# Patient Record
Sex: FEMALE | Race: OTHER | Employment: STUDENT | URBAN - METROPOLITAN AREA
[De-identification: names, ages, dates, MRNs, and addresses within clinical notes are randomized per-mention and may not be internally consistent; named-entity substitution may affect disease eponyms.]

---
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
## 2021-02-25 ENCOUNTER — OFFICE VISIT (OUTPATIENT)
Dept: FAMILY MEDICINE CLINIC | Facility: CLINIC | Age: 21
End: 2021-02-25
Payer: COMMERCIAL

## 2021-02-25 ENCOUNTER — LAB ENCOUNTER (OUTPATIENT)
Dept: LAB | Facility: HOSPITAL | Age: 21
End: 2021-02-25
Attending: FAMILY MEDICINE
Payer: COMMERCIAL

## 2021-02-25 ENCOUNTER — TELEPHONE (OUTPATIENT)
Dept: FAMILY MEDICINE CLINIC | Facility: CLINIC | Age: 21
End: 2021-02-25

## 2021-02-25 VITALS
RESPIRATION RATE: 16 BRPM | SYSTOLIC BLOOD PRESSURE: 133 MMHG | HEIGHT: 63 IN | BODY MASS INDEX: 26.08 KG/M2 | HEART RATE: 106 BPM | DIASTOLIC BLOOD PRESSURE: 78 MMHG | WEIGHT: 147.19 LBS

## 2021-02-25 DIAGNOSIS — N64.3 GALACTORRHEA: ICD-10-CM

## 2021-02-25 DIAGNOSIS — N64.3 GALACTORRHEA: Primary | ICD-10-CM

## 2021-02-25 DIAGNOSIS — Z00.00 ROUTINE PHYSICAL EXAMINATION: ICD-10-CM

## 2021-02-25 LAB
ESTRADIOL SERPL-MCNC: 18.9 PG/ML
HCG SERPL QL: NEGATIVE
PROGEST SERPL-MCNC: 0.94 NG/ML
PROLACTIN SERPL-MCNC: 37.3 NG/ML
TSI SER-ACNC: 3.05 MIU/ML (ref 0.36–3.74)

## 2021-02-25 PROCEDURE — 84146 ASSAY OF PROLACTIN: CPT

## 2021-02-25 PROCEDURE — 3075F SYST BP GE 130 - 139MM HG: CPT | Performed by: FAMILY MEDICINE

## 2021-02-25 PROCEDURE — 84703 CHORIONIC GONADOTROPIN ASSAY: CPT

## 2021-02-25 PROCEDURE — 84144 ASSAY OF PROGESTERONE: CPT

## 2021-02-25 PROCEDURE — 84443 ASSAY THYROID STIM HORMONE: CPT

## 2021-02-25 PROCEDURE — 3008F BODY MASS INDEX DOCD: CPT | Performed by: FAMILY MEDICINE

## 2021-02-25 PROCEDURE — 82306 VITAMIN D 25 HYDROXY: CPT

## 2021-02-25 PROCEDURE — 82670 ASSAY OF TOTAL ESTRADIOL: CPT

## 2021-02-25 PROCEDURE — 3078F DIAST BP <80 MM HG: CPT | Performed by: FAMILY MEDICINE

## 2021-02-25 PROCEDURE — 99213 OFFICE O/P EST LOW 20 MIN: CPT | Performed by: FAMILY MEDICINE

## 2021-02-25 PROCEDURE — 36415 COLL VENOUS BLD VENIPUNCTURE: CPT

## 2021-02-25 RX ORDER — AZELAIC ACID 0.15 G/G
GEL TOPICAL
COMMUNITY
Start: 2020-12-03 | End: 2021-09-14 | Stop reason: ALTCHOICE

## 2021-02-25 NOTE — PROGRESS NOTES
Blood pressure 133/78, pulse 106, resp. rate 16, height 5' 3\" (1.6 m), weight 147 lb 3 oz (66.8 kg), last menstrual period 02/08/2021, not currently breastfeeding.     Patient presents today complaining of bilateral nipple discharge with some itching on th

## 2021-02-26 LAB — 25(OH)D3 SERPL-MCNC: 15.5 NG/ML (ref 30–100)

## 2021-04-01 ENCOUNTER — HOSPITAL ENCOUNTER (OUTPATIENT)
Dept: MRI IMAGING | Age: 21
Discharge: HOME | End: 2021-04-01
Attending: FAMILY MEDICINE
Payer: COMMERCIAL

## 2021-04-01 DIAGNOSIS — N64.3 GALACTORRHEA: ICD-10-CM

## 2021-04-01 PROCEDURE — 70553 MRI BRAIN STEM W/O & W/DYE: CPT | Performed by: FAMILY MEDICINE

## 2021-04-01 PROCEDURE — A9575 INJ GADOTERATE MEGLUMI 0.1ML: HCPCS | Performed by: FAMILY MEDICINE

## 2021-06-06 ENCOUNTER — PATIENT MESSAGE (OUTPATIENT)
Dept: FAMILY MEDICINE CLINIC | Facility: CLINIC | Age: 21
End: 2021-06-06

## 2021-06-07 NOTE — TELEPHONE ENCOUNTER
From: Adeline Kimball  To: Kin Grace DO  Sent: 6/6/2021 7:21 PM CDT  Subject: Non-Urgent Medical Question    Hello,    I am starting a new job with [employer] and they are requesting proof of TDAP, MMR, Varicella, Flu Shot, and TB im

## 2021-06-10 NOTE — TELEPHONE ENCOUNTER
Patient is calling regarding status of immunization list. She needs by tomorrow 6/11. Please call back or send info via [email or web address].

## 2021-07-11 NOTE — TELEPHONE ENCOUNTER
, Please advise lov 3/21/17 for routine physical. Can we generate sports physical or would pt have to come in for visit.
, Please generate sport physical. Thank you
Call transferred
LMTCB- Message forward to . Please transferred to ext [phone]. Here until 7:30 PM today.
LMTCB- Please transferred to ext [phone].  Here until 5:30 PM.
LMTCB. Physical form is available for pick-up at [facility] front office.
NEED TO KNOW WHICH SPORTS AND WHICH SEASONS.
Ok to generate form for cross country.
Patient mother called and stated checking the status of form completion-    Requesting a call back when ready for -
Patient will be participating with [place] which is a Fall sport.
Patient's mother calling - requesting call when the physical form is ready and she will pick it up from the office.
Pt had an px on 3/21/17. Mother asking if Dr. Svetlana Lopez can complete an sports px based on 3/21 visit ?
Spoke with mom. Physical needed ASAP, or pt will be kicked out of program. Alejandrina Guadarrama wants a call back when she can  the sports physical, if possible today. Thank you!  NK
11-Jul-2021 08:56

## 2021-07-23 ENCOUNTER — TELEPHONE (OUTPATIENT)
Dept: OBGYN CLINIC | Facility: CLINIC | Age: 21
End: 2021-07-23

## 2021-07-23 NOTE — TELEPHONE ENCOUNTER
Pharmacy refill request for:    Aurovela FE 1/20 Tablets  Take 1 tablet by mouth daily(Need annual appt before any refills can be given)  QTY:84

## 2021-07-30 ENCOUNTER — OFFICE VISIT (OUTPATIENT)
Dept: OBGYN CLINIC | Facility: CLINIC | Age: 21
End: 2021-07-30
Payer: COMMERCIAL

## 2021-07-30 VITALS — BODY MASS INDEX: 27 KG/M2 | SYSTOLIC BLOOD PRESSURE: 110 MMHG | WEIGHT: 151 LBS | DIASTOLIC BLOOD PRESSURE: 78 MMHG

## 2021-07-30 DIAGNOSIS — Z11.3 SCREEN FOR STD (SEXUALLY TRANSMITTED DISEASE): Primary | ICD-10-CM

## 2021-07-30 DIAGNOSIS — Z30.41 ENCOUNTER FOR SURVEILLANCE OF CONTRACEPTIVE PILLS: ICD-10-CM

## 2021-07-30 PROCEDURE — 99213 OFFICE O/P EST LOW 20 MIN: CPT | Performed by: OBSTETRICS & GYNECOLOGY

## 2021-07-30 PROCEDURE — 3074F SYST BP LT 130 MM HG: CPT | Performed by: OBSTETRICS & GYNECOLOGY

## 2021-07-30 PROCEDURE — 3078F DIAST BP <80 MM HG: CPT | Performed by: OBSTETRICS & GYNECOLOGY

## 2021-07-30 RX ORDER — NORETHINDRONE ACETATE AND ETHINYL ESTRADIOL 1MG-20(21)
1 KIT ORAL DAILY
Qty: 84 TABLET | Refills: 3 | Status: SHIPPED | OUTPATIENT
Start: 2021-07-30 | End: 2021-09-14 | Stop reason: ALTCHOICE

## 2021-07-30 NOTE — PROGRESS NOTES
HPI/Subjective:   Patient ID: Aldo De Guzman is a 22year old female. Patient here for contraception surveillance. Has been on Microgestin for cycle control for the last six years. Desires to stop OCPs to see how cycle is now.   Patient was sexually a Appearance: Normal appearance. She is normal weight. Abdominal:      General: Abdomen is flat. Palpations: Abdomen is soft. Skin:     General: Skin is warm and dry. Neurological:      General: No focal deficit present.       Mental Status: She is

## 2021-08-02 ENCOUNTER — LAB ENCOUNTER (OUTPATIENT)
Dept: LAB | Facility: HOSPITAL | Age: 21
End: 2021-08-02
Attending: OBSTETRICS & GYNECOLOGY
Payer: COMMERCIAL

## 2021-08-02 DIAGNOSIS — Z11.3 SCREEN FOR STD (SEXUALLY TRANSMITTED DISEASE): ICD-10-CM

## 2021-08-02 PROCEDURE — 87491 CHLMYD TRACH DNA AMP PROBE: CPT

## 2021-08-02 PROCEDURE — 87591 N.GONORRHOEAE DNA AMP PROB: CPT

## 2021-08-03 LAB
C TRACH DNA SPEC QL NAA+PROBE: NEGATIVE
N GONORRHOEA DNA SPEC QL NAA+PROBE: NEGATIVE

## 2021-08-30 ENCOUNTER — HOSPITAL ENCOUNTER (OUTPATIENT)
Age: 21
Discharge: HOME | End: 2021-08-30
Payer: COMMERCIAL

## 2021-08-30 VITALS
SYSTOLIC BLOOD PRESSURE: 137 MMHG | WEIGHT: 150 LBS | BODY MASS INDEX: 26.58 KG/M2 | TEMPERATURE: 99 F | HEART RATE: 80 BPM | RESPIRATION RATE: 18 BRPM | DIASTOLIC BLOOD PRESSURE: 64 MMHG | HEIGHT: 63 IN | OXYGEN SATURATION: 100 %

## 2021-08-30 DIAGNOSIS — J02.0 STREPTOCOCCAL SORE THROAT: Primary | ICD-10-CM

## 2021-08-30 LAB — S PYO AG THROAT QL: POSITIVE

## 2021-08-30 PROCEDURE — 99213 OFFICE O/P EST LOW 20 MIN: CPT | Performed by: NURSE PRACTITIONER

## 2021-08-30 PROCEDURE — 87880 STREP A ASSAY W/OPTIC: CPT | Performed by: NURSE PRACTITIONER

## 2021-08-30 RX ORDER — AMOXICILLIN 500 MG/1
500 TABLET, FILM COATED ORAL 2 TIMES DAILY
Qty: 20 TABLET | Refills: 0 | Status: SHIPPED | OUTPATIENT
Start: 2021-08-30 | End: 2021-09-09

## 2021-09-02 NOTE — ED PROVIDER NOTES
Patient Seen in: [facility]      History   Patient presents with:  Sore Throat    Stated Complaint: SORE THROAT    HPI/Subjective:   HPI    21 yo female not concerned with covid arrives to the ic with st    Objective:   History reviewed.  No p oropharyngeal exudate. Eyes:      Conjunctiva/sclera: Conjunctivae normal.      Pupils: Pupils are equal, round, and reactive to light. Pulmonary:      Effort: Pulmonary effort is normal. No respiratory distress.    Abdominal:      General: Abdomen is f bedside monitoring of interventions, collecting and independently interpreting tests, discussion with consultants, patient communication/counseling, and chart documentation but not including time spent performing procedures.             MDM   Findings consi

## 2021-09-12 ENCOUNTER — HOSPITAL ENCOUNTER (OUTPATIENT)
Age: 21
Discharge: HOME | End: 2021-09-12
Payer: COMMERCIAL

## 2021-09-12 VITALS
RESPIRATION RATE: 18 BRPM | OXYGEN SATURATION: 99 % | BODY MASS INDEX: 26.58 KG/M2 | WEIGHT: 150 LBS | DIASTOLIC BLOOD PRESSURE: 75 MMHG | TEMPERATURE: 98 F | HEART RATE: 102 BPM | SYSTOLIC BLOOD PRESSURE: 135 MMHG | HEIGHT: 63 IN

## 2021-09-12 DIAGNOSIS — J02.0 STREPTOCOCCAL SORE THROAT: Primary | ICD-10-CM

## 2021-09-12 LAB
POCT MONO: NEGATIVE
S PYO AG THROAT QL: POSITIVE

## 2021-09-12 PROCEDURE — 86308 HETEROPHILE ANTIBODY SCREEN: CPT | Performed by: NURSE PRACTITIONER

## 2021-09-12 PROCEDURE — 36415 COLL VENOUS BLD VENIPUNCTURE: CPT | Performed by: NURSE PRACTITIONER

## 2021-09-12 PROCEDURE — 87880 STREP A ASSAY W/OPTIC: CPT | Performed by: NURSE PRACTITIONER

## 2021-09-12 PROCEDURE — 99213 OFFICE O/P EST LOW 20 MIN: CPT | Performed by: NURSE PRACTITIONER

## 2021-09-12 RX ORDER — DEXAMETHASONE SODIUM PHOSPHATE 10 MG/ML
10 INJECTION, SOLUTION INTRAMUSCULAR; INTRAVENOUS ONCE
Status: COMPLETED | OUTPATIENT
Start: 2021-09-12 | End: 2021-09-12

## 2021-09-12 RX ORDER — AMOXICILLIN AND CLAVULANATE POTASSIUM 875; 125 MG/1; MG/1
1 TABLET, FILM COATED ORAL 2 TIMES DAILY
Qty: 20 TABLET | Refills: 0 | Status: SHIPPED | OUTPATIENT
Start: 2021-09-12 | End: 2021-09-22

## 2021-09-12 RX ORDER — IBUPROFEN 600 MG/1
600 TABLET ORAL EVERY 8 HOURS PRN
Qty: 30 TABLET | Refills: 0 | Status: SHIPPED | OUTPATIENT
Start: 2021-09-12 | End: 2021-09-19

## 2021-09-12 NOTE — ED PROVIDER NOTES
Patient Seen in: [facility]      History   Patient presents with:  Sore Throat    Stated Complaint: sore throat/swelling    Subjective:   Evangeline Cockayne is a 59-year-old female presenting to the immediate care complaining of sore throat sinc above.    Physical Exam     ED Triage Vitals [09/12/21 1428]   /75   Pulse 102   Resp 18   Temp 98.4 °F (36.9 °C)   Temp src Temporal   SpO2 99 %   O2 Device None (Room air)       Current:/75   Pulse 102   Temp 98.4 °F (36.9 °C) (Temporal)   Re Capillary refill takes less than 2 seconds. Findings: No rash. Neurological:      General: No focal deficit present. Mental Status: She is alert and oriented to person, place, and time.    Psychiatric:         Mood and Affect: Mood normal. and Plan     Clinical Impression:  Streptococcal sore throat  (primary encounter diagnosis)     Disposition:  Discharge  9/12/2021  2:57 pm    Follow-up:  Giselle Min DO  [address] [place] [id number]                Medic

## 2021-09-14 ENCOUNTER — OFFICE VISIT (OUTPATIENT)
Dept: FAMILY MEDICINE CLINIC | Facility: CLINIC | Age: 21
End: 2021-09-14
Payer: COMMERCIAL

## 2021-09-14 VITALS
HEIGHT: 63 IN | WEIGHT: 150 LBS | DIASTOLIC BLOOD PRESSURE: 72 MMHG | BODY MASS INDEX: 26.58 KG/M2 | HEART RATE: 89 BPM | SYSTOLIC BLOOD PRESSURE: 118 MMHG

## 2021-09-14 DIAGNOSIS — J35.01 TONSILLITIS, CHRONIC: Primary | ICD-10-CM

## 2021-09-14 PROCEDURE — 3008F BODY MASS INDEX DOCD: CPT | Performed by: FAMILY MEDICINE

## 2021-09-14 PROCEDURE — 99213 OFFICE O/P EST LOW 20 MIN: CPT | Performed by: FAMILY MEDICINE

## 2021-09-14 PROCEDURE — 3074F SYST BP LT 130 MM HG: CPT | Performed by: FAMILY MEDICINE

## 2021-09-14 PROCEDURE — 3078F DIAST BP <80 MM HG: CPT | Performed by: FAMILY MEDICINE

## 2021-09-14 RX ORDER — DAPSONE 75 MG/G
GEL TOPICAL
COMMUNITY
Start: 2021-05-22

## 2021-09-14 NOTE — PROGRESS NOTES
Blood pressure 118/72, pulse 89, height 5' 3\" (1.6 m), weight 150 lb (68 kg), last menstrual period 08/22/2021, not currently breastfeeding. Recently diagnosed with strep. Taking Augmentin. Has dysphagia no dysphonia.   Has noticed improvement no

## 2021-09-16 ENCOUNTER — OFFICE VISIT (OUTPATIENT)
Dept: OTOLARYNGOLOGY | Facility: CLINIC | Age: 21
End: 2021-09-16
Payer: COMMERCIAL

## 2021-09-16 VITALS — TEMPERATURE: 98 F | BODY MASS INDEX: 26.58 KG/M2 | HEIGHT: 63 IN | WEIGHT: 150 LBS

## 2021-09-16 DIAGNOSIS — H60.543 DERMATITIS OF EAR CANAL, BILATERAL: ICD-10-CM

## 2021-09-16 DIAGNOSIS — J03.90 TONSILLITIS: Primary | ICD-10-CM

## 2021-09-16 PROCEDURE — 3008F BODY MASS INDEX DOCD: CPT | Performed by: OTOLARYNGOLOGY

## 2021-09-16 PROCEDURE — 99204 OFFICE O/P NEW MOD 45 MIN: CPT | Performed by: OTOLARYNGOLOGY

## 2021-09-16 RX ORDER — FLUOCINOLONE ACETONIDE 0.11 MG/ML
OIL AURICULAR (OTIC)
Qty: 1 EACH | Refills: 1 | Status: SHIPPED | OUTPATIENT
Start: 2021-09-16

## 2021-09-16 RX ORDER — PREDNISONE 20 MG/1
TABLET ORAL
Qty: 7 TABLET | Refills: 0 | Status: SHIPPED | OUTPATIENT
Start: 2021-09-16 | End: 2021-12-02 | Stop reason: ALTCHOICE

## 2021-09-16 NOTE — PROGRESS NOTES
Cm Figueroa is a 22year old female. Patient presents with:   Tonsil Problem: patient stated white spot at the back of throat tonsilt are still little swollen positive strep x2  on antibiotic  Ear Problem: pt stated both ears and itchy       HISTORY OF heat intolerance. Neuro Negative Tremors. Psych Negative Anxiety and depression. Integumentary Negative Frequent skin infections, pigment change and rash. Hema/Lymph Negative Easy bleeding and easy bruising.            PHYSICAL EXAM    Temp 97.8 °F 1 tablet (600 mg total) by mouth every 8 (eight) hours as needed for Pain or Fever., Disp: 30 tablet, Rfl: 0  •  Levocetirizine Dihydrochloride 5 MG Oral Tab, Take 1 tablet (5 mg total) by mouth every evening., Disp: 90 tablet, Rfl: 2  •  triamcinolone ace

## 2021-10-05 ENCOUNTER — TELEPHONE (OUTPATIENT)
Dept: OTOLARYNGOLOGY | Facility: CLINIC | Age: 21
End: 2021-10-05

## 2021-10-05 RX ORDER — CLINDAMYCIN HYDROCHLORIDE 300 MG/1
300 CAPSULE ORAL 3 TIMES DAILY
Qty: 42 CAPSULE | Refills: 0 | Status: SHIPPED | OUTPATIENT
Start: 2021-10-05 | End: 2021-10-19

## 2021-10-05 NOTE — TELEPHONE ENCOUNTER
Patient calling back to follow up. Would like to know if theres any antibiotics that she can take in the mean time.  Please advise

## 2021-10-05 NOTE — TELEPHONE ENCOUNTER
Dr Madhu Gunter made aware  of note below and order for Clindamycin 300 mg tid x 14 days ,Rx sent to pharmacy ,pt informed and verbalized understanding.

## 2021-10-05 NOTE — TELEPHONE ENCOUNTER
Dr Gill Sham per pt symptoms is back again sore throat she feels like having another strep,informed pt per last note if having frequent infection consider tonsillectomy,pt verbalized understanding,please advise.

## 2021-12-02 ENCOUNTER — OFFICE VISIT (OUTPATIENT)
Dept: FAMILY MEDICINE CLINIC | Facility: CLINIC | Age: 21
End: 2021-12-02
Payer: COMMERCIAL

## 2021-12-02 VITALS
WEIGHT: 157 LBS | BODY MASS INDEX: 27.82 KG/M2 | DIASTOLIC BLOOD PRESSURE: 75 MMHG | HEART RATE: 80 BPM | HEIGHT: 63 IN | SYSTOLIC BLOOD PRESSURE: 118 MMHG

## 2021-12-02 DIAGNOSIS — Z00.00 ROUTINE PHYSICAL EXAMINATION: Primary | ICD-10-CM

## 2021-12-02 DIAGNOSIS — E55.9 VITAMIN D DEFICIENCY: ICD-10-CM

## 2021-12-02 PROCEDURE — 3008F BODY MASS INDEX DOCD: CPT | Performed by: FAMILY MEDICINE

## 2021-12-02 PROCEDURE — 3074F SYST BP LT 130 MM HG: CPT | Performed by: FAMILY MEDICINE

## 2021-12-02 PROCEDURE — 99395 PREV VISIT EST AGE 18-39: CPT | Performed by: FAMILY MEDICINE

## 2021-12-02 PROCEDURE — 3078F DIAST BP <80 MM HG: CPT | Performed by: FAMILY MEDICINE

## 2021-12-02 NOTE — PROGRESS NOTES
REASON FOR VISIT:    Cm Figueroa is a 22year old female who presents for an [address].         Patient Active Problem List:     Acute pharyngitis     Encounter for routine child health examination without abnormal findings     Excessive specific diagnoses    ALLERGIES:   No Known Allergies  CURRENT MEDICATIONS:   Current Outpatient Medications   Medication Sig Dispense Refill   • Fluocinolone Acetonide 0.01 % Otic Oil 3 drops bid for 14 days 1 each 1   • Dapsone 7.5 % External Gel      • 118/72  09/12/21 : 135/75    Patient's last menstrual period was 08/22/2021 (approximate).     GENERAL: well developed, well nourished, in no apparent distress   SKIN: no rashes, no suspicious lesions  HEENT: atraumatic, normocephalic, ears and throat are c

## 2022-01-10 ENCOUNTER — TELEPHONE (OUTPATIENT)
Dept: INTERNAL MEDICINE CLINIC | Facility: HOSPITAL | Age: 22
End: 2022-01-10

## 2022-01-10 NOTE — TELEPHONE ENCOUNTER
Outside Covid Testing done    Results and RTW guidelines:    COVID RESULT reported:      Test type:    [x] Rapid outside         [] PCR outside       [] Home Test    Date of test: 12/30/2021    Test location: [address] [place] [address]      [] Result viewed in Epic with verbal c communication open with management about RTW and if symptoms worsen     Notes:     RTW PLAN:    []  If COVID positive results, off work minimum of 5 days from positive test or onset of symptoms (day 0)    []      On day 5, if asymptomatic or mildly symptom 100F               Yes []          • Cough                          Yes []      • Shortness of breath  Yes []      • Congestion                 Yes []      • Runny nose                Yes []        • Loss of Smell              Yes []       •  Loss of Taste

## 2022-01-10 NOTE — TELEPHONE ENCOUNTER
Attempted to call employee at number listed and the number is disconnected. Will route to in basket until we can speak to employee.

## 2022-01-18 NOTE — TELEPHONE ENCOUNTER
Department:                               [] [facility]  []NIKI   [x] [facility]    Dept Manager/Supervisor/team or clinical lead: Melissa Grubbs    Position:  [] MD     [] RN     [] Respiratory Therapist     [] PCT     [] PSR      [] BHA     [] MA [x] Other Cindy Rosas you next scheduled to work? Did you have close contact with someone on your unit while not wearing a mask? (e.g., during meal breaks):  Yes []   No []    If yes, who:   Do you share a workspace? Yes []   No []       If yes, with whom?    Do you have any if asymptomatic or mildly symptomatic (with improving symptoms) may return to work day 6  []      On day 5, if symptomatic, call Employee Health for RTW screening        []  Plan noted above  []  Length of time to obtain results  []  Quarantine instruction

## 2022-04-21 ENCOUNTER — OFFICE VISIT (OUTPATIENT)
Dept: FAMILY MEDICINE CLINIC | Facility: CLINIC | Age: 22
End: 2022-04-21
Payer: COMMERCIAL

## 2022-04-21 ENCOUNTER — NURSE TRIAGE (OUTPATIENT)
Dept: FAMILY MEDICINE CLINIC | Facility: CLINIC | Age: 22
End: 2022-04-21

## 2022-04-21 VITALS
HEIGHT: 63 IN | DIASTOLIC BLOOD PRESSURE: 70 MMHG | WEIGHT: 158 LBS | BODY MASS INDEX: 28 KG/M2 | SYSTOLIC BLOOD PRESSURE: 119 MMHG | HEART RATE: 61 BPM

## 2022-04-21 DIAGNOSIS — J02.0 STREP PHARYNGITIS: Primary | ICD-10-CM

## 2022-04-21 DIAGNOSIS — J02.9 SORE THROAT: ICD-10-CM

## 2022-04-21 LAB
CONTROL LINE PRESENT WITH A CLEAR BACKGROUND (YES/NO): YES YES/NO
KIT LOT #: NORMAL NUMERIC
STREP GRP A CUL-SCR: POSITIVE

## 2022-04-21 PROCEDURE — 3008F BODY MASS INDEX DOCD: CPT | Performed by: PHYSICIAN ASSISTANT

## 2022-04-21 PROCEDURE — 3078F DIAST BP <80 MM HG: CPT | Performed by: PHYSICIAN ASSISTANT

## 2022-04-21 PROCEDURE — 99213 OFFICE O/P EST LOW 20 MIN: CPT | Performed by: PHYSICIAN ASSISTANT

## 2022-04-21 PROCEDURE — 87880 STREP A ASSAY W/OPTIC: CPT | Performed by: PHYSICIAN ASSISTANT

## 2022-04-21 PROCEDURE — 3074F SYST BP LT 130 MM HG: CPT | Performed by: PHYSICIAN ASSISTANT

## 2022-04-21 RX ORDER — AMOXICILLIN 875 MG/1
875 TABLET, COATED ORAL 2 TIMES DAILY
Qty: 20 TABLET | Refills: 0 | Status: SHIPPED | OUTPATIENT
Start: 2022-04-21 | End: 2022-05-01

## 2022-08-19 ENCOUNTER — IMMUNIZATION (OUTPATIENT)
Dept: LAB | Age: 22
End: 2022-08-19
Attending: EMERGENCY MEDICINE
Payer: COMMERCIAL

## 2022-08-19 DIAGNOSIS — Z23 NEED FOR VACCINATION: Primary | ICD-10-CM

## 2022-08-19 PROCEDURE — 0064A SARSCOV2 VAC 50MCG/0.25ML IM: CPT

## 2022-10-17 ENCOUNTER — HOSPITAL ENCOUNTER (OUTPATIENT)
Age: 22
Discharge: HOME | End: 2022-10-17
Payer: COMMERCIAL

## 2022-10-17 VITALS
OXYGEN SATURATION: 100 % | BODY MASS INDEX: 27.46 KG/M2 | HEART RATE: 59 BPM | SYSTOLIC BLOOD PRESSURE: 125 MMHG | RESPIRATION RATE: 18 BRPM | TEMPERATURE: 99 F | DIASTOLIC BLOOD PRESSURE: 65 MMHG | WEIGHT: 155 LBS | HEIGHT: 63 IN

## 2022-10-17 DIAGNOSIS — Z20.822 ENCOUNTER FOR LABORATORY TESTING FOR COVID-19 VIRUS: Primary | ICD-10-CM

## 2022-10-17 DIAGNOSIS — J02.0 STREPTOCOCCAL SORE THROAT: ICD-10-CM

## 2022-10-17 LAB
S PYO AG THROAT QL: POSITIVE
SARS-COV-2 RNA RESP QL NAA+PROBE: NOT DETECTED

## 2022-10-17 PROCEDURE — 99213 OFFICE O/P EST LOW 20 MIN: CPT

## 2022-10-17 PROCEDURE — 87880 STREP A ASSAY W/OPTIC: CPT

## 2022-10-17 PROCEDURE — U0002 COVID-19 LAB TEST NON-CDC: HCPCS

## 2022-10-17 RX ORDER — AMOXICILLIN 500 MG/1
500 TABLET, FILM COATED ORAL 2 TIMES DAILY
Qty: 20 TABLET | Refills: 0 | Status: SHIPPED | OUTPATIENT
Start: 2022-10-17 | End: 2022-10-27

## 2022-10-17 NOTE — ED INITIAL ASSESSMENT (HPI)
Pt in [address] [place] [address] for c/o sore throat, concerned for strep. States has been having sore throat, pain to uvula, runny nose and R ear pain x today. No fever.

## 2023-02-25 ENCOUNTER — HOSPITAL ENCOUNTER (OUTPATIENT)
Age: 23
Discharge: HOME | End: 2023-02-25
Payer: COMMERCIAL

## 2023-02-25 VITALS
BODY MASS INDEX: 26.58 KG/M2 | DIASTOLIC BLOOD PRESSURE: 62 MMHG | RESPIRATION RATE: 18 BRPM | TEMPERATURE: 98 F | WEIGHT: 150 LBS | SYSTOLIC BLOOD PRESSURE: 129 MMHG | OXYGEN SATURATION: 97 % | HEIGHT: 63 IN | HEART RATE: 84 BPM

## 2023-02-25 DIAGNOSIS — J02.9 SORE THROAT: ICD-10-CM

## 2023-02-25 DIAGNOSIS — J06.9 UPPER RESPIRATORY TRACT INFECTION, UNSPECIFIED TYPE: Primary | ICD-10-CM

## 2023-02-25 LAB
S PYO AG THROAT QL: NEGATIVE
SARS-COV-2 RNA RESP QL NAA+PROBE: NOT DETECTED

## 2023-02-25 PROCEDURE — U0002 COVID-19 LAB TEST NON-CDC: HCPCS | Performed by: EMERGENCY MEDICINE

## 2023-02-25 PROCEDURE — 99213 OFFICE O/P EST LOW 20 MIN: CPT | Performed by: EMERGENCY MEDICINE

## 2023-02-25 PROCEDURE — 87880 STREP A ASSAY W/OPTIC: CPT | Performed by: EMERGENCY MEDICINE

## 2023-02-25 RX ORDER — IBUPROFEN 600 MG/1
600 TABLET ORAL ONCE
Status: COMPLETED | OUTPATIENT
Start: 2023-02-25 | End: 2023-02-25

## 2023-02-25 NOTE — DISCHARGE INSTRUCTIONS
Read after visit summary for home treatment and er precautions. Salt water gargles every few hours,   Avoid spicy foods. Work note provided. Throat lozenges. Cool mist humidifier at night. Mucinex fast max all in one cold and flu \"white bottle with color wheel on front of it. \"

## 2023-09-29 ENCOUNTER — LAB ENCOUNTER (OUTPATIENT)
Dept: LAB | Age: 23
End: 2023-09-29
Attending: FAMILY MEDICINE
Payer: COMMERCIAL

## 2023-09-29 ENCOUNTER — OFFICE VISIT (OUTPATIENT)
Dept: FAMILY MEDICINE CLINIC | Facility: CLINIC | Age: 23
End: 2023-09-29

## 2023-09-29 VITALS
DIASTOLIC BLOOD PRESSURE: 71 MMHG | SYSTOLIC BLOOD PRESSURE: 113 MMHG | WEIGHT: 172 LBS | HEIGHT: 63 IN | BODY MASS INDEX: 30.48 KG/M2 | HEART RATE: 76 BPM

## 2023-09-29 DIAGNOSIS — E55.9 VITAMIN D DEFICIENCY: ICD-10-CM

## 2023-09-29 DIAGNOSIS — N92.4 EXCESSIVE BLEEDING IN PREMENOPAUSAL PERIOD: ICD-10-CM

## 2023-09-29 DIAGNOSIS — E55.9 VITAMIN D DEFICIENCY: Primary | ICD-10-CM

## 2023-09-29 LAB
BASOPHILS # BLD AUTO: 0.04 X10(3) UL (ref 0–0.2)
BASOPHILS NFR BLD AUTO: 0.8 %
CHOLEST SERPL-MCNC: 128 MG/DL (ref ?–200)
DEPRECATED RDW RBC AUTO: 37.8 FL (ref 35.1–46.3)
EOSINOPHIL # BLD AUTO: 0.02 X10(3) UL (ref 0–0.7)
EOSINOPHIL NFR BLD AUTO: 0.4 %
ERYTHROCYTE [DISTWIDTH] IN BLOOD BY AUTOMATED COUNT: 12.5 % (ref 11–15)
FASTING PATIENT GLUCOSE ANSWER: YES
FASTING PATIENT LIPID ANSWER: YES
GLUCOSE BLD-MCNC: 86 MG/DL (ref 70–99)
HCT VFR BLD AUTO: 36.4 %
HDLC SERPL-MCNC: 43 MG/DL (ref 40–59)
HGB BLD-MCNC: 12.5 G/DL
IMM GRANULOCYTES # BLD AUTO: 0.01 X10(3) UL (ref 0–1)
IMM GRANULOCYTES NFR BLD: 0.2 %
LDLC SERPL CALC-MCNC: 77 MG/DL (ref ?–100)
LYMPHOCYTES # BLD AUTO: 1.2 X10(3) UL (ref 1–4)
LYMPHOCYTES NFR BLD AUTO: 23.3 %
MCH RBC QN AUTO: 28.3 PG (ref 26–34)
MCHC RBC AUTO-ENTMCNC: 34.3 G/DL (ref 31–37)
MCV RBC AUTO: 82.5 FL
MONOCYTES # BLD AUTO: 0.32 X10(3) UL (ref 0.1–1)
MONOCYTES NFR BLD AUTO: 6.2 %
NEUTROPHILS # BLD AUTO: 3.57 X10 (3) UL (ref 1.5–7.7)
NEUTROPHILS # BLD AUTO: 3.57 X10(3) UL (ref 1.5–7.7)
NEUTROPHILS NFR BLD AUTO: 69.1 %
NONHDLC SERPL-MCNC: 85 MG/DL (ref ?–130)
PLATELET # BLD AUTO: 227 10(3)UL (ref 150–450)
RBC # BLD AUTO: 4.41 X10(6)UL
TRIGL SERPL-MCNC: 31 MG/DL (ref 30–149)
TSI SER-ACNC: 1.57 MIU/ML (ref 0.36–3.74)
VIT D+METAB SERPL-MCNC: 24.2 NG/ML (ref 30–100)
VLDLC SERPL CALC-MCNC: 5 MG/DL (ref 0–30)
WBC # BLD AUTO: 5.2 X10(3) UL (ref 4–11)

## 2023-09-29 PROCEDURE — 85025 COMPLETE CBC W/AUTO DIFF WBC: CPT

## 2023-09-29 PROCEDURE — 90472 IMMUNIZATION ADMIN EACH ADD: CPT | Performed by: FAMILY MEDICINE

## 2023-09-29 PROCEDURE — 99213 OFFICE O/P EST LOW 20 MIN: CPT | Performed by: FAMILY MEDICINE

## 2023-09-29 PROCEDURE — 3078F DIAST BP <80 MM HG: CPT | Performed by: FAMILY MEDICINE

## 2023-09-29 PROCEDURE — 82306 VITAMIN D 25 HYDROXY: CPT

## 2023-09-29 PROCEDURE — 3008F BODY MASS INDEX DOCD: CPT | Performed by: FAMILY MEDICINE

## 2023-09-29 PROCEDURE — 3074F SYST BP LT 130 MM HG: CPT | Performed by: FAMILY MEDICINE

## 2023-09-29 PROCEDURE — 80061 LIPID PANEL: CPT

## 2023-09-29 PROCEDURE — 90686 IIV4 VACC NO PRSV 0.5 ML IM: CPT | Performed by: FAMILY MEDICINE

## 2023-09-29 PROCEDURE — 82947 ASSAY GLUCOSE BLOOD QUANT: CPT

## 2023-09-29 PROCEDURE — 36415 COLL VENOUS BLD VENIPUNCTURE: CPT

## 2023-09-29 PROCEDURE — 90471 IMMUNIZATION ADMIN: CPT | Performed by: FAMILY MEDICINE

## 2023-09-29 PROCEDURE — 84443 ASSAY THYROID STIM HORMONE: CPT

## 2023-09-29 PROCEDURE — 90715 TDAP VACCINE 7 YRS/> IM: CPT | Performed by: FAMILY MEDICINE

## 2023-09-29 RX ORDER — DOXYCYCLINE 100 MG/1
CAPSULE ORAL
COMMUNITY

## 2023-09-29 RX ORDER — SPIRONOLACTONE 25 MG/1
TABLET ORAL
COMMUNITY

## 2023-09-29 NOTE — PROGRESS NOTES
Blood pressure 113/71, pulse 76, height 5' 3\" (1.6 m), weight 172 lb (78 kg), last menstrual period 02/04/2023, not currently breastfeeding. Patient presents today following up for weight gain. She exercises. She reports that she does get heavy menses. History of vitamin D deficiency.     Objective patient is comfortable no apparent distress    Assessment history of vitamin D deficiency with menorrhagia weight gain    Plan blood test today we will follow with results discussed dietary options

## 2024-01-24 ENCOUNTER — HOSPITAL ENCOUNTER (OUTPATIENT)
Age: 24
Discharge: HOME | End: 2024-01-24
Payer: COMMERCIAL

## 2024-01-24 VITALS
TEMPERATURE: 98 F | OXYGEN SATURATION: 98 % | SYSTOLIC BLOOD PRESSURE: 127 MMHG | DIASTOLIC BLOOD PRESSURE: 69 MMHG | HEART RATE: 81 BPM | RESPIRATION RATE: 18 BRPM

## 2024-01-24 DIAGNOSIS — J02.9 VIRAL PHARYNGITIS: ICD-10-CM

## 2024-01-24 DIAGNOSIS — B34.9 VIRAL ILLNESS: Primary | ICD-10-CM

## 2024-01-24 LAB
S PYO AG THROAT QL: NEGATIVE
SARS-COV-2 RNA RESP QL NAA+PROBE: NOT DETECTED

## 2024-01-24 PROCEDURE — U0002 COVID-19 LAB TEST NON-CDC: HCPCS | Performed by: PHYSICIAN ASSISTANT

## 2024-01-24 PROCEDURE — 87880 STREP A ASSAY W/OPTIC: CPT | Performed by: PHYSICIAN ASSISTANT

## 2024-01-24 PROCEDURE — 99213 OFFICE O/P EST LOW 20 MIN: CPT | Performed by: PHYSICIAN ASSISTANT

## 2024-01-24 NOTE — DISCHARGE INSTRUCTIONS
Today you were diagnosed with a viral illness.  Take antihistamines and decongestants for sinus congestion and postnasal drip cough.  At nighttime take 1 to 2 tablets, 25 mg to 50 mg tablets of diphenhydramine (Benadryl) to help with sinus congestion and cough.  You may additionally take 1 tablet of Claritin or Zyrtec during the day to help with sinus congestion.  For added decongestion relief, you may additionally take pseudoephedrine (Sudafed).  Take Tylenol and ibuprofen as needed for pain.  Seek prompt medical reevaluation if you begin to have worsening pain, difficulty swallowing, drooling, shortness of breath or chest pain.

## 2024-01-24 NOTE — ED PROVIDER NOTES
Patient Seen in: [facility]      History     Chief Complaint   Patient presents with    Sore Throat     Stated Complaint: Sore Throat    Subjective:   HPI    Patient is a healthy 23-year-old female who presents to immediate care due to sore throat that began last night.  Associate symptoms include mild cough sinus congestion.  Denies known sick contacts.  Took Excedrin last night with significant relief.  Denying chest pain shortness of breath wheezing difficulty swallowing drooling.    Objective:   No pertinent past medical history.            No pertinent past surgical history.              No pertinent social history.            Review of Systems    Positive for stated complaint: Sore Throat  Other systems are as noted in HPI.  Constitutional and vital signs reviewed.      All other systems reviewed and negative except as noted above.    Physical Exam     ED Triage Vitals [01/24/24 1002]   /69   Pulse 81   Resp 18   Temp 98.2 °F (36.8 °C)   Temp src Temporal   SpO2 98 %   O2 Device None (Room air)       Current:/69   Pulse 81   Temp 98.2 °F (36.8 °C) (Temporal)   Resp 18   LMP 02/04/2023   SpO2 98%         Physical Exam    Vital signs reviewed. Nursing note reviewed.  Constitutional: Well-developed. Well-nourished. In no acute distress  HENT: Mucous membranes moist. TMs intact bilaterally. No trismus. Uvula midline. Mild posterior pharynx erythema.  No petechiae, exudates, or posterior pharynx edema.  EYES: No scleral icterus or conjunctival injection.  NECK: Full ROM. Supple.   CARDIAC: Normal rate. Normal S1/ S2. 2+ distal pulses. No edema  PULM/CHEST: Clear to auscultation bilaterally. No wheezes  Extremities: Full ROM  NEURO: Awake, alert, following commands, moving extremities, answering questions.   SKIN: Warm and dry. No rash or lesions.  PSYCH: Normal judgment. Normal affect.        ED Course     Labs Reviewed   POCT RAPID STREP - Normal   RAPID SARS-COV-2 BY PCR - Normal                       MDM      Patient is a healthy 23-year-old female who presents to immediate care due to sore throat x 1 day.  Patient arrives afebrile nontoxic speaking complete sentences.  Physical exam showing mild posterior pharynx erythema.  Most likely viral pharyngitis, viral URI, viral illness less likely COVID-19 and strep pharyngitis as patient had rapid negative test today in immediate care.  Discussed conservative treatment including Tylenol ibuprofen as needed for pain, over-the-counter cough medication such as Robitussin.  History given by patient.  Return precautions including worsening pain difficulty swallowing drooling shortness of breath.  Patient agreeable to plan all questions answered.                                   Medical Decision Making      Disposition and Plan     Clinical Impression:  1. Viral illness    2. Viral pharyngitis         Disposition:  Discharge  1/24/2024 10:50 am    Follow-up:  Skinny Grace, DO  [address] [place] [address]  [phone]    Call             Medications Prescribed:  Current Discharge Medication List

## 2024-02-26 ENCOUNTER — OFFICE VISIT (OUTPATIENT)
Dept: FAMILY MEDICINE CLINIC | Facility: CLINIC | Age: 24
End: 2024-02-26
Payer: COMMERCIAL

## 2024-02-26 VITALS
HEART RATE: 97 BPM | BODY MASS INDEX: 31.01 KG/M2 | WEIGHT: 175 LBS | HEIGHT: 63 IN | DIASTOLIC BLOOD PRESSURE: 76 MMHG | SYSTOLIC BLOOD PRESSURE: 113 MMHG

## 2024-02-26 DIAGNOSIS — L03.317 CELLULITIS OF BUTTOCK: Primary | ICD-10-CM

## 2024-02-26 PROCEDURE — 3074F SYST BP LT 130 MM HG: CPT | Performed by: FAMILY MEDICINE

## 2024-02-26 PROCEDURE — 99213 OFFICE O/P EST LOW 20 MIN: CPT | Performed by: FAMILY MEDICINE

## 2024-02-26 PROCEDURE — 3078F DIAST BP <80 MM HG: CPT | Performed by: FAMILY MEDICINE

## 2024-02-26 PROCEDURE — 3008F BODY MASS INDEX DOCD: CPT | Performed by: FAMILY MEDICINE

## 2024-02-26 RX ORDER — CEPHALEXIN 500 MG/1
500 TABLET ORAL 4 TIMES DAILY
Qty: 40 TABLET | Refills: 0 | Status: SHIPPED | OUTPATIENT
Start: 2024-02-26

## 2024-02-27 NOTE — PROGRESS NOTES
Blood pressure 113/76, pulse 97, height 5' 3\" (1.6 m), weight 175 lb (79.4 kg), not currently breastfeeding.      Tender red bump near gluteal cleft for about a week.  No fever.  Otherwise feels well.    Objective right buttock near gluteal cleft superior aspect with erythematous tender bump nonfluctuant no drainage    Assessment cellulitis    Plan cephalexin sitz baths follow-up if no improvement

## 2024-02-29 ENCOUNTER — TELEPHONE (OUTPATIENT)
Dept: FAMILY MEDICINE CLINIC | Facility: CLINIC | Age: 24
End: 2024-02-29

## 2024-02-29 NOTE — TELEPHONE ENCOUNTER
Patient contacts clinic reporting symptoms on buttock are unchanged.  Same level of discomfort.  Denies new redness, swelling or fever.  Does not feel abx are helping.  Dr. Grace please advise on next steps.

## 2024-03-01 ENCOUNTER — OFFICE VISIT (OUTPATIENT)
Dept: FAMILY MEDICINE CLINIC | Facility: CLINIC | Age: 24
End: 2024-03-01
Payer: COMMERCIAL

## 2024-03-01 VITALS
WEIGHT: 174 LBS | DIASTOLIC BLOOD PRESSURE: 71 MMHG | BODY MASS INDEX: 30.83 KG/M2 | HEIGHT: 63 IN | HEART RATE: 105 BPM | SYSTOLIC BLOOD PRESSURE: 104 MMHG

## 2024-03-01 DIAGNOSIS — L02.31 GLUTEAL ABSCESS: Primary | ICD-10-CM

## 2024-03-01 PROCEDURE — 3074F SYST BP LT 130 MM HG: CPT | Performed by: FAMILY MEDICINE

## 2024-03-01 PROCEDURE — 3078F DIAST BP <80 MM HG: CPT | Performed by: FAMILY MEDICINE

## 2024-03-01 PROCEDURE — 99213 OFFICE O/P EST LOW 20 MIN: CPT | Performed by: FAMILY MEDICINE

## 2024-03-01 PROCEDURE — 3008F BODY MASS INDEX DOCD: CPT | Performed by: FAMILY MEDICINE

## 2024-03-01 RX ORDER — HYDROCODONE BITARTRATE AND ACETAMINOPHEN 10; 325 MG/1; MG/1
1 TABLET ORAL EVERY 6 HOURS PRN
Qty: 10 TABLET | Refills: 0 | Status: SHIPPED | OUTPATIENT
Start: 2024-03-01

## 2024-03-01 NOTE — PROGRESS NOTES
Blood pressure 104/71, pulse 105, height 5' 3\" (1.6 m), weight 174 lb (78.9 kg), not currently breastfeeding.          Continued pain in the gluteal area.  Denies allergies of pregnancy.    Objective gluteal abscess noted right buttock    Assessment gluteal abscess    Plan incision and drainage continue antibiotics [place] for pain    Culture swab sent

## 2024-03-01 NOTE — PROCEDURES
Informed consent obtained all questions answered    Incision and drainage of gluteal abscess performed with packing of abscess with iodoform gauze    Follow-up in 3 days    Sitz baths twice daily    Continue antibiotics

## 2024-03-04 ENCOUNTER — OFFICE VISIT (OUTPATIENT)
Dept: FAMILY MEDICINE CLINIC | Facility: CLINIC | Age: 24
End: 2024-03-04

## 2024-03-04 ENCOUNTER — MED REC SCAN ONLY (OUTPATIENT)
Dept: FAMILY MEDICINE CLINIC | Facility: CLINIC | Age: 24
End: 2024-03-04

## 2024-03-04 VITALS
HEART RATE: 99 BPM | BODY MASS INDEX: 30.83 KG/M2 | DIASTOLIC BLOOD PRESSURE: 76 MMHG | WEIGHT: 174 LBS | HEIGHT: 63 IN | TEMPERATURE: 99 F | SYSTOLIC BLOOD PRESSURE: 117 MMHG

## 2024-03-04 DIAGNOSIS — L02.31 GLUTEAL ABSCESS: Primary | ICD-10-CM

## 2024-03-04 PROCEDURE — 3008F BODY MASS INDEX DOCD: CPT | Performed by: FAMILY MEDICINE

## 2024-03-04 PROCEDURE — 3074F SYST BP LT 130 MM HG: CPT | Performed by: FAMILY MEDICINE

## 2024-03-04 PROCEDURE — 3078F DIAST BP <80 MM HG: CPT | Performed by: FAMILY MEDICINE

## 2024-03-04 PROCEDURE — 99212 OFFICE O/P EST SF 10 MIN: CPT | Performed by: FAMILY MEDICINE

## 2024-03-04 NOTE — PROGRESS NOTES
Blood pressure 117/76, pulse 99, temperature 98.7 °F (37.1 °C), height 5' 3\" (1.6 m), weight 174 lb (78.9 kg), not currently breastfeeding.          Patient presents today following up for gluteal abscess.  Feels much better.  Has not required any pain medicine since the first day.  Taking antibiotics.  Doing sitz bath's.    Objective wound with packing place bloody discharge no pus mild tenderness noted    No erythema    Assessment status post incision and drainage of gluteal abscess    Plan iodoform packing removed    Complete antibiotics continue sitz bath's for another couple days will await final culture results

## 2024-03-12 NOTE — TELEPHONE ENCOUNTER
Per mom she would like to have pt birth control RX refilled please advise let her know pt is due for her annual 9-12-18 Other

## 2024-05-18 ENCOUNTER — LAB REQUISITION (OUTPATIENT)
Dept: LAB | Age: 24
End: 2024-05-18

## 2024-05-18 DIAGNOSIS — Z13.29 ENCOUNTER FOR SCREENING FOR OTHER SUSPECTED ENDOCRINE DISORDER: ICD-10-CM

## 2024-05-18 DIAGNOSIS — Z13.21 ENCOUNTER FOR SCREENING FOR NUTRITIONAL DISORDER: ICD-10-CM

## 2024-05-18 DIAGNOSIS — R63.5 ABNORMAL WEIGHT GAIN: ICD-10-CM

## 2024-05-18 DIAGNOSIS — F34.1 DYSTHYMIC DISORDER: ICD-10-CM

## 2024-05-18 DIAGNOSIS — Z79.899 OTHER LONG TERM (CURRENT) DRUG THERAPY: ICD-10-CM

## 2024-05-18 DIAGNOSIS — Z13.228 ENCOUNTER FOR SCREENING FOR OTHER METABOLIC DISORDERS: ICD-10-CM

## 2024-05-18 DIAGNOSIS — N92.6 IRREGULAR MENSTRUATION, UNSPECIFIED: ICD-10-CM

## 2024-05-18 DIAGNOSIS — R53.83 OTHER FATIGUE: ICD-10-CM

## 2024-05-18 DIAGNOSIS — Z82.69 FAMILY HISTORY OF OTHER DISEASES OF THE MUSCULOSKELETAL SYSTEM AND CONNECTIVE TISSUE: ICD-10-CM

## 2024-05-18 DIAGNOSIS — Z00.01 ENCOUNTER FOR GENERAL ADULT MEDICAL EXAMINATION WITH ABNORMAL FINDINGS: ICD-10-CM

## 2024-05-18 DIAGNOSIS — L70.9 ACNE, UNSPECIFIED: ICD-10-CM

## 2024-05-18 DIAGNOSIS — Z13.220 ENCOUNTER FOR SCREENING FOR LIPOID DISORDERS: ICD-10-CM

## 2024-05-31 PROCEDURE — 84630 ASSAY OF ZINC: CPT | Performed by: CLINICAL MEDICAL LABORATORY

## 2024-05-31 PROCEDURE — PSEU8299 THYROID STIMULATING HORMONE: Performed by: CLINICAL MEDICAL LABORATORY

## 2024-05-31 PROCEDURE — 84144 ASSAY OF PROGESTERONE: CPT | Performed by: CLINICAL MEDICAL LABORATORY

## 2024-05-31 PROCEDURE — 86800 THYROGLOBULIN ANTIBODY: CPT | Performed by: CLINICAL MEDICAL LABORATORY

## 2024-05-31 PROCEDURE — 84481 FREE ASSAY (FT-3): CPT | Performed by: CLINICAL MEDICAL LABORATORY

## 2024-05-31 PROCEDURE — PSEU8258 ESTRADIOL: Performed by: CLINICAL MEDICAL LABORATORY

## 2024-05-31 PROCEDURE — PSEU8115 INSULIN LEVEL, FASTING: Performed by: CLINICAL MEDICAL LABORATORY

## 2024-05-31 PROCEDURE — 84403 ASSAY OF TOTAL TESTOSTERONE: CPT | Performed by: CLINICAL MEDICAL LABORATORY

## 2024-05-31 PROCEDURE — 82728 ASSAY OF FERRITIN: CPT | Performed by: CLINICAL MEDICAL LABORATORY

## 2024-05-31 PROCEDURE — PSEU8229 VITAMIN D -25 HYDROXY: Performed by: CLINICAL MEDICAL LABORATORY

## 2024-05-31 PROCEDURE — PSEU8196 FREE T3: Performed by: CLINICAL MEDICAL LABORATORY

## 2024-05-31 PROCEDURE — 82533 TOTAL CORTISOL: CPT | Performed by: CLINICAL MEDICAL LABORATORY

## 2024-05-31 PROCEDURE — 86376 MICROSOMAL ANTIBODY EACH: CPT | Performed by: CLINICAL MEDICAL LABORATORY

## 2024-05-31 PROCEDURE — 84439 ASSAY OF FREE THYROXINE: CPT | Performed by: CLINICAL MEDICAL LABORATORY

## 2024-05-31 PROCEDURE — 82627 DEHYDROEPIANDROSTERONE: CPT | Performed by: CLINICAL MEDICAL LABORATORY

## 2024-05-31 PROCEDURE — 85652 RBC SED RATE AUTOMATED: CPT | Performed by: CLINICAL MEDICAL LABORATORY

## 2024-05-31 PROCEDURE — PSEU9835 TESTOSTERONE, BIOAVAILABLE, SEX BINDING GLOBULIN, AND TOTAL, FEMALE OR CHILD: Performed by: CLINICAL MEDICAL LABORATORY

## 2024-05-31 PROCEDURE — PSEU8191 DHEA SULFATE: Performed by: CLINICAL MEDICAL LABORATORY

## 2024-05-31 PROCEDURE — 83735 ASSAY OF MAGNESIUM: CPT | Performed by: CLINICAL MEDICAL LABORATORY

## 2024-05-31 PROCEDURE — 83540 ASSAY OF IRON: CPT | Performed by: CLINICAL MEDICAL LABORATORY

## 2024-05-31 PROCEDURE — PSEU8263 GGT: Performed by: CLINICAL MEDICAL LABORATORY

## 2024-05-31 PROCEDURE — 84480 ASSAY TRIIODOTHYRONINE (T3): CPT | Performed by: CLINICAL MEDICAL LABORATORY

## 2024-05-31 PROCEDURE — 82306 VITAMIN D 25 HYDROXY: CPT | Performed by: CLINICAL MEDICAL LABORATORY

## 2024-05-31 PROCEDURE — 82670 ASSAY OF TOTAL ESTRADIOL: CPT | Performed by: CLINICAL MEDICAL LABORATORY

## 2024-05-31 PROCEDURE — 83615 LACTATE (LD) (LDH) ENZYME: CPT | Performed by: CLINICAL MEDICAL LABORATORY

## 2024-05-31 PROCEDURE — PSEU8303 URIC ACID: Performed by: CLINICAL MEDICAL LABORATORY

## 2024-05-31 PROCEDURE — 81003 URINALYSIS AUTO W/O SCOPE: CPT | Performed by: CLINICAL MEDICAL LABORATORY

## 2024-05-31 PROCEDURE — 84402 ASSAY OF FREE TESTOSTERONE: CPT | Performed by: CLINICAL MEDICAL LABORATORY

## 2024-05-31 PROCEDURE — 84550 ASSAY OF BLOOD/URIC ACID: CPT | Performed by: CLINICAL MEDICAL LABORATORY

## 2024-05-31 PROCEDURE — 80050 GENERAL HEALTH PANEL: CPT | Performed by: CLINICAL MEDICAL LABORATORY

## 2024-05-31 PROCEDURE — 83525 ASSAY OF INSULIN: CPT | Performed by: CLINICAL MEDICAL LABORATORY

## 2024-05-31 PROCEDURE — PSEU8171 VITAMIN B12 AND FOLATE: Performed by: CLINICAL MEDICAL LABORATORY

## 2024-05-31 PROCEDURE — PSEU8223 T3, TOTAL: Performed by: CLINICAL MEDICAL LABORATORY

## 2024-05-31 PROCEDURE — 84270 ASSAY OF SEX HORMONE GLOBUL: CPT | Performed by: CLINICAL MEDICAL LABORATORY

## 2024-05-31 PROCEDURE — PSEU8187 CORTISOL: Performed by: CLINICAL MEDICAL LABORATORY

## 2024-05-31 PROCEDURE — PSEU8262 FREE T4: Performed by: CLINICAL MEDICAL LABORATORY

## 2024-05-31 PROCEDURE — 83001 ASSAY OF GONADOTROPIN (FSH): CPT | Performed by: CLINICAL MEDICAL LABORATORY

## 2024-05-31 PROCEDURE — PSEU8268 LACTATE DEHYDROGENASE: Performed by: CLINICAL MEDICAL LABORATORY

## 2024-05-31 PROCEDURE — PSEU8266 GLYCOHEMOGLOBIN: Performed by: CLINICAL MEDICAL LABORATORY

## 2024-05-31 PROCEDURE — PSEU8135 LIPID PANEL WITH REFLEX: Performed by: CLINICAL MEDICAL LABORATORY

## 2024-05-31 PROCEDURE — 83550 IRON BINDING TEST: CPT | Performed by: CLINICAL MEDICAL LABORATORY

## 2024-05-31 PROCEDURE — PSEU8506 C REACTIVE PROTEIN HIGH SENSITIVITY: Performed by: CLINICAL MEDICAL LABORATORY

## 2024-05-31 PROCEDURE — PSEU8286 PROGESTERONE: Performed by: CLINICAL MEDICAL LABORATORY

## 2024-05-31 PROCEDURE — PSEU8259 FERRITIN: Performed by: CLINICAL MEDICAL LABORATORY

## 2024-05-31 PROCEDURE — 86141 C-REACTIVE PROTEIN HS: CPT | Performed by: CLINICAL MEDICAL LABORATORY

## 2024-05-31 PROCEDURE — PSEU8556 THYROID ANTIBODIES: Performed by: CLINICAL MEDICAL LABORATORY

## 2024-05-31 PROCEDURE — 83002 ASSAY OF GONADOTROPIN (LH): CPT | Performed by: CLINICAL MEDICAL LABORATORY

## 2024-05-31 PROCEDURE — PSEU8088 FOLLICLE STIMULATING & LUTEINIZING HORMONES: Performed by: CLINICAL MEDICAL LABORATORY

## 2024-05-31 PROCEDURE — 80061 LIPID PANEL: CPT | Performed by: CLINICAL MEDICAL LABORATORY

## 2024-05-31 PROCEDURE — 83036 HEMOGLOBIN GLYCOSYLATED A1C: CPT | Performed by: CLINICAL MEDICAL LABORATORY

## 2024-05-31 PROCEDURE — PSEU8250 COMPREHENSIVE METABOLIC PANEL: Performed by: CLINICAL MEDICAL LABORATORY

## 2024-05-31 PROCEDURE — PSEU8203 IRON AND TOTAL IRON BINDING CAPACITY: Performed by: CLINICAL MEDICAL LABORATORY

## 2024-05-31 PROCEDURE — 82746 ASSAY OF FOLIC ACID SERUM: CPT | Performed by: CLINICAL MEDICAL LABORATORY

## 2024-05-31 PROCEDURE — 82977 ASSAY OF GGT: CPT | Performed by: CLINICAL MEDICAL LABORATORY

## 2024-05-31 PROCEDURE — 82607 VITAMIN B-12: CPT | Performed by: CLINICAL MEDICAL LABORATORY

## 2024-05-31 PROCEDURE — PSEU8274 MAGNESIUM: Performed by: CLINICAL MEDICAL LABORATORY

## 2024-05-31 PROCEDURE — 83088 ASSAY OF HISTAMINE: CPT | Performed by: CLINICAL MEDICAL LABORATORY

## 2024-05-31 PROCEDURE — PSEU8904 ZINC: Performed by: CLINICAL MEDICAL LABORATORY

## 2024-06-01 LAB
25(OH)D3+25(OH)D2 SERPL-MCNC: 35.1 NG/ML (ref 30–100)
ALBUMIN SERPL-MCNC: 4.1 G/DL (ref 3.6–5.1)
ALBUMIN/GLOB SERPL: 1.2 {RATIO} (ref 1–2.4)
ALP SERPL-CCNC: 85 UNITS/L (ref 45–117)
ALT SERPL-CCNC: 22 UNITS/L
ANION GAP SERPL CALC-SCNC: 9 MMOL/L (ref 7–19)
APPEARANCE UR: CLEAR
AST SERPL-CCNC: 16 UNITS/L
BASOPHILS # BLD: 0 K/MCL (ref 0–0.3)
BASOPHILS NFR BLD: 1 %
BILIRUB SERPL-MCNC: 0.7 MG/DL (ref 0.2–1)
BILIRUB UR QL STRIP: NEGATIVE
BUN SERPL-MCNC: 12 MG/DL (ref 6–20)
BUN/CREAT SERPL: 17 (ref 7–25)
CALCIUM SERPL-MCNC: 9.5 MG/DL (ref 8.4–10.2)
CHLORIDE SERPL-SCNC: 105 MMOL/L (ref 97–110)
CHOLEST SERPL-MCNC: 163 MG/DL
CHOLEST/HDLC SERPL: 2.9 {RATIO}
CO2 SERPL-SCNC: 27 MMOL/L (ref 21–32)
COLOR UR: COLORLESS
CORTIS SERPL-MCNC: 16.1 MCG/DL (ref 3.4–22.5)
CREAT SERPL-MCNC: 0.7 MG/DL (ref 0.51–0.95)
CRP SERPL HS-MCNC: 1.11 MG/L
DEPRECATED RDW RBC: 40 FL (ref 39–50)
EGFRCR SERPLBLD CKD-EPI 2021: >90 ML/MIN/{1.73_M2}
EOSINOPHIL # BLD: 0.1 K/MCL (ref 0–0.5)
EOSINOPHIL NFR BLD: 2 %
ERYTHROCYTE [DISTWIDTH] IN BLOOD: 13 % (ref 11–15)
ERYTHROCYTE [SEDIMENTATION RATE] IN BLOOD BY WESTERGREN METHOD: 14 MM/HR (ref 0–20)
ESTRADIOL SERPL-MCNC: 61 PG/ML
FASTING DURATION TIME PATIENT: 8 HOURS (ref 0–999)
FASTING DURATION TIME PATIENT: 8 HOURS (ref 0–999)
FERRITIN SERPL-MCNC: 13 NG/ML (ref 8–252)
FOLATE SERPL-MCNC: 9.3 NG/ML
FSH SERPL-ACNC: 6.8 MUNITS/ML
GGT SERPL-CCNC: 9 UNITS/L (ref 5–55)
GLOBULIN SER-MCNC: 3.3 G/DL (ref 2–4)
GLUCOSE SERPL-MCNC: 86 MG/DL (ref 70–99)
GLUCOSE UR STRIP-MCNC: NEGATIVE MG/DL
HBA1C MFR BLD: 4.8 % (ref 4.5–5.6)
HCT VFR BLD CALC: 41.3 % (ref 36–46.5)
HDLC SERPL-MCNC: 57 MG/DL
HGB BLD-MCNC: 13.8 G/DL (ref 12–15.5)
HGB UR QL STRIP: NEGATIVE
IMM GRANULOCYTES # BLD AUTO: 0 K/MCL (ref 0–0.2)
IMM GRANULOCYTES # BLD: 0 %
INSULIN P FAST SERPL-ACNC: 18 MUNITS/L (ref 3–28)
IRON SATN MFR SERPL: 17 % (ref 15–45)
IRON SERPL-MCNC: 61 MCG/DL (ref 50–170)
KETONES UR STRIP-MCNC: NEGATIVE MG/DL
LDH SERPL L TO P-CCNC: 175 UNITS/L (ref 82–240)
LDLC SERPL CALC-MCNC: 96 MG/DL
LEUKOCYTE ESTERASE UR QL STRIP: NEGATIVE
LH SERPL-ACNC: 3.8 MUNITS/ML
LYMPHOCYTES # BLD: 1.4 K/MCL (ref 1–4.8)
LYMPHOCYTES NFR BLD: 36 %
MAGNESIUM SERPL-MCNC: 1.9 MG/DL (ref 1.7–2.4)
MCH RBC QN AUTO: 28.3 PG (ref 26–34)
MCHC RBC AUTO-ENTMCNC: 33.4 G/DL (ref 32–36.5)
MCV RBC AUTO: 84.8 FL (ref 78–100)
MONOCYTES # BLD: 0.3 K/MCL (ref 0.3–0.9)
MONOCYTES NFR BLD: 7 %
NEUTROPHILS # BLD: 2.2 K/MCL (ref 1.8–7.7)
NEUTROPHILS NFR BLD: 54 %
NITRITE UR QL STRIP: NEGATIVE
NONHDLC SERPL-MCNC: 106 MG/DL
NRBC BLD MANUAL-RTO: 0 /100 WBC
PH UR STRIP: 6.5 [PH] (ref 5–7)
PLATELET # BLD AUTO: 248 K/MCL (ref 140–450)
POTASSIUM SERPL-SCNC: 4.4 MMOL/L (ref 3.4–5.1)
PROGEST SERPL-MCNC: 0.39 NG/ML
PROT SERPL-MCNC: 7.4 G/DL (ref 6.4–8.2)
PROT UR STRIP-MCNC: NEGATIVE MG/DL
RBC # BLD: 4.87 MIL/MCL (ref 4–5.2)
SODIUM SERPL-SCNC: 137 MMOL/L (ref 135–145)
SP GR UR STRIP: 1 (ref 1–1.03)
T3 SERPL-MCNC: 1.2 NG/ML (ref 0.6–1.81)
T3FREE SERPL-MCNC: 3.1 PG/ML (ref 2.2–4)
T4 FREE SERPL-MCNC: 1 NG/DL (ref 0.8–1.5)
T4 SERPL-MCNC: 10.5 MCG/DL (ref 4.7–13.3)
TIBC SERPL-MCNC: 367 MCG/DL (ref 250–450)
TRIGL SERPL-MCNC: 50 MG/DL
TSH SERPL-ACNC: 2.08 MCUNITS/ML (ref 0.35–5)
URATE SERPL-MCNC: 3.5 MG/DL (ref 2.6–5.9)
UROBILINOGEN UR STRIP-MCNC: 0.2 MG/DL
VIT B12 SERPL-MCNC: 546 PG/ML (ref 211–911)
WBC # BLD: 4 K/MCL (ref 4.2–11)

## 2024-06-02 LAB
THYROGLOB AB SERPL-ACNC: <0.9 IU/ML (ref 0–4)
THYROPEROXIDASE AB SERPL-ACNC: 78 UNITS/ML

## 2024-06-03 ENCOUNTER — NURSE ONLY (OUTPATIENT)
Dept: INTERNAL MEDICINE CLINIC | Facility: HOSPITAL | Age: 24
End: 2024-06-03
Attending: EMERGENCY MEDICINE

## 2024-06-03 DIAGNOSIS — Z00.00 WELLNESS EXAMINATION: Primary | ICD-10-CM

## 2024-06-03 LAB
DHEA-S SERPL-MCNC: 270.6 MCG/DL (ref 8–391)
HISTAMINE SERPL-SCNC: <8 NMOL/L (ref 0–8)
ZINC SERPL-MCNC: 78 MCG/DL (ref 70–120)

## 2024-06-03 PROCEDURE — 86480 TB TEST CELL IMMUN MEASURE: CPT

## 2024-06-05 ENCOUNTER — TELEPHONE (OUTPATIENT)
Dept: FAMILY MEDICINE CLINIC | Facility: CLINIC | Age: 24
End: 2024-06-05

## 2024-06-05 LAB
M TB IFN-G CD4+ T-CELLS BLD-ACNC: 0.03 IU/ML
M TB TUBERC IFN-G BLD QL: NEGATIVE
M TB TUBERC IGNF/MITOGEN IGNF CONTROL: >10 IU/ML
QFT TB1 AG MINUS NIL: 0 IU/ML
QFT TB2 AG MINUS NIL: 0.01 IU/ML
SHBG SERPL-SCNC: 29 NMOL/L (ref 25–122)
TESTOST FREE SERPL DL<=1.0 NG/DL-MCNC: 3.5 PG/ML (ref 0.8–7.4)
TESTOST SERPL-MCNC: 20 NG/DL (ref 9–55)
TESTOSTERONE.FREE+WB SERPL-MCNC: 10.1 NG/DL (ref 2.2–20.6)

## 2024-07-21 ENCOUNTER — TELEPHONE (OUTPATIENT)
Dept: FAMILY MEDICINE CLINIC | Facility: CLINIC | Age: 24
End: 2024-07-21

## 2024-07-21 RX ORDER — AMOXICILLIN AND CLAVULANATE POTASSIUM 875; 125 MG/1; MG/1
1 TABLET, FILM COATED ORAL 2 TIMES DAILY
Qty: 20 TABLET | Refills: 0 | Status: SHIPPED | OUTPATIENT
Start: 2024-07-21 | End: 2024-07-31

## 2024-07-23 ENCOUNTER — OFFICE VISIT (OUTPATIENT)
Dept: FAMILY MEDICINE CLINIC | Facility: CLINIC | Age: 24
End: 2024-07-23

## 2024-07-23 VITALS
BODY MASS INDEX: 30.83 KG/M2 | SYSTOLIC BLOOD PRESSURE: 119 MMHG | HEART RATE: 82 BPM | HEIGHT: 63 IN | DIASTOLIC BLOOD PRESSURE: 73 MMHG | WEIGHT: 174 LBS

## 2024-07-23 DIAGNOSIS — L05.91 PILONIDAL CYST: Primary | ICD-10-CM

## 2024-07-23 PROBLEM — L29.9 EAR ITCH: Status: RESOLVED | Noted: 2018-12-11 | Resolved: 2024-07-23

## 2024-07-23 PROBLEM — H60.92 OTITIS EXTERNA OF LEFT EAR: Status: RESOLVED | Noted: 2018-05-15 | Resolved: 2024-07-23

## 2024-07-23 PROBLEM — H61.23 BILATERAL IMPACTED CERUMEN: Status: RESOLVED | Noted: 2017-10-26 | Resolved: 2024-07-23

## 2024-07-23 PROCEDURE — 3078F DIAST BP <80 MM HG: CPT | Performed by: PHYSICIAN ASSISTANT

## 2024-07-23 PROCEDURE — 99213 OFFICE O/P EST LOW 20 MIN: CPT | Performed by: PHYSICIAN ASSISTANT

## 2024-07-23 PROCEDURE — 3074F SYST BP LT 130 MM HG: CPT | Performed by: PHYSICIAN ASSISTANT

## 2024-07-23 PROCEDURE — 3008F BODY MASS INDEX DOCD: CPT | Performed by: PHYSICIAN ASSISTANT

## 2024-07-23 NOTE — PROGRESS NOTES
HPI:     HPI  A 24-year-old female is in the office for a follow-up. The patient states that the patient had a recurrent pilonidal cyst infection on 7/19/24. The patient is taking Augmentin for the past 3 days. The abscess is better.   The patient denies pain, fever or redness.    Medications:     Current Outpatient Medications   Medication Sig Dispense Refill    amoxicillin clavulanate 875-125 MG Oral Tab Take 1 tablet by mouth 2 (two) times daily for 10 days. 20 tablet 0    spironolactone 25 MG Oral Tab       Doxycycline Monohydrate 100 MG Oral Cap       Levocetirizine Dihydrochloride 5 MG Oral Tab Take 1 tablet (5 mg total) by mouth every evening. 90 tablet 2       Allergies:   No Known Allergies    History:     Health Maintenance   Topic Date Due    Pap Smear  Never done    Annual Physical  12/02/2022    COVID-19 Vaccine (4 - 2023-24 season) 09/01/2023    Annual Depression Screening  01/01/2024    Influenza Vaccine (1) 10/01/2024    DTaP,Tdap,and Td Vaccines (8 - Td or Tdap) 09/29/2033    HPV Vaccines  Completed    Pneumococcal Vaccine: Birth to 64yrs  Aged Out       Patient's last menstrual period was 07/21/2024 (approximate).   Past Medical History:   History reviewed. No pertinent past medical history.    Past Surgical History:   History reviewed. No pertinent surgical history.    Family History:     Family History   Problem Relation Age of Onset    Other (Other) Father         Myasthenia Gravis    Other (Other) Other         No Sudden Cardiac Death of Close Relative       Social History:     Social History     Socioeconomic History    Marital status: Single     Spouse name: Not on file    Number of children: Not on file    Years of education: Not on file    Highest education level: Not on file   Occupational History    Not on file   Tobacco Use    Smoking status: Never    Smokeless tobacco: Never    Tobacco comments:     No  Households Smokers    Vaping Use    Vaping status: Never Used   Substance and Sexual  Activity    Alcohol use: No    Drug use: No    Sexual activity: Not on file   Other Topics Concern     Service Not Asked    Blood Transfusions Not Asked    Caffeine Concern No    Occupational Exposure Not Asked    Hobby Hazards Not Asked    Sleep Concern Not Asked    Stress Concern Not Asked    Weight Concern Not Asked    Special Diet Not Asked    Back Care Not Asked    Exercise Not Asked    Bike Helmet Not Asked    Seat Belt Not Asked    Self-Exams Not Asked    Grew up on a farm Not Asked    History of tanning Not Asked    Outdoor occupation Not Asked    Pt has a pacemaker No    Pt has a defibrillator No    Breast feeding Not Asked    Reaction to local anesthetic No   Social History Narrative    Not on file     Social Determinants of Health     Financial Resource Strain: Low Risk  (6/6/2024)    Received from [facility]    Overall Financial Resource Strain (CARDIA)     Difficulty of Paying Living Expenses: Not hard at all   Food Insecurity: No Food Insecurity (6/6/2024)    Received from [facility]    Hunger Vital Sign     Worried About Running Out of Food in the Last Year: Never true     Ran Out of Food in the Last Year: Never true   Transportation Needs: No Transportation Needs (6/6/2024)    Received from [facility]    PRAPARE - Transportation     Lack of Transportation (Medical): No     Lack of Transportation (Non-Medical): No   Physical Activity: Insufficiently Active (6/6/2024)    Received from [facility]    Exercise Vital Sign     Days of Exercise per Week: 3 days     Minutes of Exercise per Session: 30 min   Stress: No Stress Concern Present (6/6/2024)    Received from [facility]    [employer] [place] [employer] - Occupational Stress Questionnaire     Feeling of Stress : Only a little   Social Connections: Socially Isolated (6/6/2024)    Received from [facility]    Social  Connection and Isolation Panel [NHANES]     Frequency of Communication with Friends and Family: More than three times a week     Frequency of Social Gatherings with Friends and Family: Once a week     Attends Nondenominational Services: Never     Active Member of Clubs or Organizations: No     Attends Club or Organization Meetings: Never     Marital Status: Never    Housing Stability: Unknown (6/6/2024)    Received from [facility] [place]    Housing Stability Vital Sign     Unable to Pay for Housing in the Last Year: No     Number of Times Moved in the Last Year: Not on file     Homeless in the Last Year: No       Review of Systems:   Review of Systems   Skin:  Positive for rash.        Vitals:    07/23/24 1241   BP: 119/73   Pulse: 82   Weight: 174 lb (78.9 kg)   Height: 5' 3\" (1.6 m)     Body mass index is 30.82 kg/m².    Physical Exam:   Physical Exam  Vitals reviewed.      There is a small lesion on the intergluteal cleft.    Assessment and Plan::     Problem List Items Addressed This Visit    None  Visit Diagnoses       Pilonidal cyst    -  Primary    Relevant Orders    Surgery Referral - In Network        Continue to finish Augmentin as prescribed.    Discussed plan of care with pt and pt is in agreement.All questions answered. Pt to call with questions or concerns.

## 2024-08-09 ENCOUNTER — OFFICE VISIT (OUTPATIENT)
Dept: OBGYN CLINIC | Facility: CLINIC | Age: 24
End: 2024-08-09
Payer: COMMERCIAL

## 2024-08-09 VITALS
SYSTOLIC BLOOD PRESSURE: 125 MMHG | BODY MASS INDEX: 30.8 KG/M2 | HEIGHT: 63 IN | DIASTOLIC BLOOD PRESSURE: 80 MMHG | WEIGHT: 173.81 LBS

## 2024-08-09 DIAGNOSIS — Z01.419 WELL WOMAN EXAM WITH ROUTINE GYNECOLOGICAL EXAM: Primary | ICD-10-CM

## 2024-08-09 DIAGNOSIS — L70.0 ACNE VULGARIS: ICD-10-CM

## 2024-08-09 DIAGNOSIS — L73.9 FOLLICULITIS: ICD-10-CM

## 2024-08-09 PROCEDURE — 99385 PREV VISIT NEW AGE 18-39: CPT | Performed by: OBSTETRICS & GYNECOLOGY

## 2024-08-09 PROCEDURE — 99203 OFFICE O/P NEW LOW 30 MIN: CPT | Performed by: OBSTETRICS & GYNECOLOGY

## 2024-08-09 PROCEDURE — 3008F BODY MASS INDEX DOCD: CPT | Performed by: OBSTETRICS & GYNECOLOGY

## 2024-08-09 PROCEDURE — 3079F DIAST BP 80-89 MM HG: CPT | Performed by: OBSTETRICS & GYNECOLOGY

## 2024-08-09 PROCEDURE — 3074F SYST BP LT 130 MM HG: CPT | Performed by: OBSTETRICS & GYNECOLOGY

## 2024-08-09 RX ORDER — MULTIVIT-MIN/IRON FUM/FOLIC AC 7.5 MG-4
1 TABLET ORAL DAILY
COMMUNITY

## 2024-08-09 NOTE — PROGRESS NOTES
HPI:   Mary Wei is a 24 year old female who presents for an annual/pap.  Pt with hx of acne, on meds from dermatology.  Counseled on ocp use for acne tx. Pt was on ocp before. Pt will readdress this with her dermatologist.  Pt has folliculitis in labial area as well.  Counseled.   Wt Readings from Last 6 Encounters:   08/09/24 173 lb 12.8 oz (78.8 kg)   07/23/24 174 lb (78.9 kg)   03/04/24 174 lb (78.9 kg)   03/01/24 174 lb (78.9 kg)   02/26/24 175 lb (79.4 kg)   09/29/23 172 lb (78 kg)     Body mass index is 30.79 kg/m².    Cholesterol, Total (mg/dL)   Date Value   09/29/2023 128     HDL Cholesterol (mg/dL)   Date Value   09/29/2023 43     LDL Cholesterol (mg/dL)   Date Value   09/29/2023 77        Current Outpatient Medications   Medication Sig Dispense Refill    Fish Oil-Cholecalciferol (FISH OIL + D3 OR) Take by mouth.      Multiple Vitamins-Minerals (MULTI-VITAMIN/MINERALS) Oral Tab Take 1 tablet by mouth daily.      spironolactone 25 MG Oral Tab       Doxycycline Monohydrate 100 MG Oral Cap       Levocetirizine Dihydrochloride 5 MG Oral Tab Take 1 tablet (5 mg total) by mouth every evening. 90 tablet 2      No past medical history on file.   No past surgical history on file.   Family History   Problem Relation Age of Onset    Other (Other) Father         Myasthenia Gravis    Other (Other) Other         No Sudden Cardiac Death of Close Relative      Social History:   Social History     Socioeconomic History    Marital status: Single   Tobacco Use    Smoking status: Never     Passive exposure: Never    Smokeless tobacco: Never    Tobacco comments:     No  Households Smokers    Vaping Use    Vaping status: Never Used   Substance and Sexual Activity    Alcohol use: No    Drug use: No   Other Topics Concern    Caffeine Concern No    Pt has a pacemaker No    Pt has a defibrillator No    Reaction to local anesthetic No     Social Determinants of Health     Financial Resource Strain: Low Risk  (6/6/2024)     Received from [facility]    Overall Financial Resource Strain (CARDIA)     Difficulty of Paying Living Expenses: Not hard at all   Food Insecurity: No Food Insecurity (6/6/2024)    Received from [facility]    Hunger Vital Sign     Worried About Running Out of Food in the Last Year: Never true     Ran Out of Food in the Last Year: Never true   Transportation Needs: No Transportation Needs (6/6/2024)    Received from [facility]    PRAPARE - Transportation     Lack of Transportation (Medical): No     Lack of Transportation (Non-Medical): No   Physical Activity: Insufficiently Active (6/6/2024)    Received from [facility]    Exercise Vital Sign     Days of Exercise per Week: 3 days     Minutes of Exercise per Session: 30 min   Stress: No Stress Concern Present (6/6/2024)    Received from [facility]    [employer] [place] [employer]ational [employer] - Occupational Stress Questionnaire     Feeling of Stress : Only a little   Social Connections: Socially Isolated (6/6/2024)    Received from [facility]    Social Connection and Isolation Panel [NHANES]     Frequency of Communication with Friends and Family: More than three times a week     Frequency of Social Gatherings with Friends and Family: Once a week     Attends Anglican Services: Never     Active Member of Clubs or Organizations: No     Attends Club or Organization Meetings: Never     Marital Status: Never    Housing Stability: Unknown (6/6/2024)    Received from [facility]    Housing Stability Vital Sign     Unable to Pay for Housing in the Last Year: No     Homeless in the Last Year: No            REVIEW OF SYSTEMS:   GENERAL: feels well otherwise  SKIN: denies any unusual skin lesions  EYES:denies blurred vision or double vision  HEENT: denies nasal congestion, sinus pain or ST  LUNGS: denies shortness of breath with  exertion  CARDIOVASCULAR: denies chest pain on exertion  GI: denies abdominal pain,denies heartburn  : denies dysuria, vaginal discharge or itching,periods regular   MUSCULOSKELETAL: denies back pain  NEURO: denies headaches  PSYCHE: denies depression or anxiety  HEMATOLOGIC: denies hx of anemia  ENDOCRINE: denies thyroid history  ALL/ASTHMA: denies hx of allergy or asthma    EXAM:   /80   Ht 5' 3\" (1.6 m)   Wt 173 lb 12.8 oz (78.8 kg)   LMP 07/21/2024 (Approximate)   BMI 30.79 kg/m²   Body mass index is 30.79 kg/m².   GENERAL: well developed, well nourished,in no apparent distress  SKIN: no rashes,no suspicious lesions  HEENT: atraumatic, normocephalic  EYES:normal in appearance  NECK: supple,no adenopathy  CHEST: no chest tenderness  BREAST: no dominant or suspicious mass  LUNGS: clear to auscultation  CARDIO: RRR without murmur  GI: good BS's,no masses, HSM or tenderness  :introitus is normal,scant discharge,cervix is pink,no adnexal masses or tenderness, PAP was done   Folliculitis healing noted..  pt counseled on this.  Pt does have a dermatologist.     MUSCULOSKELETAL: back is not tender,FROM of the back  EXTREMITIES: no cyanosis, clubbing or edema  NEURO: Oriented times three      ASSESSMENT AND PLAN:   Mary Wei is a 24 year old female who presents for an  annual/pap.  Pt with hx of acne, on meds from dermatology.  Counseled on ocp use for acne tx. Pt was on ocp before. Pt will readdress this with her dermatologist.  Pt has folliculitis in labial area as well.  Counseled.  Self breast exam explained. Health maintenance. Body mass index is 30.79 kg/m²., recommended low fat diet and aerobic exercise 30 minutes three times weekly.  The patient indicates understanding of these issues and agrees to the plan.  The patient is asked to return for an annual visit.

## 2024-08-12 LAB — HPV E6+E7 MRNA CVX QL NAA+PROBE: NEGATIVE

## 2024-08-13 ENCOUNTER — TELEPHONE (OUTPATIENT)
Dept: FAMILY MEDICINE CLINIC | Facility: CLINIC | Age: 24
End: 2024-08-13

## 2024-08-13 RX ORDER — AMOXICILLIN AND CLAVULANATE POTASSIUM 875; 125 MG/1; MG/1
1 TABLET, FILM COATED ORAL 2 TIMES DAILY
Qty: 20 TABLET | Refills: 0 | Status: SHIPPED | OUTPATIENT
Start: 2024-08-13 | End: 2024-08-23

## 2024-08-13 NOTE — TELEPHONE ENCOUNTER
Patient called, verified Name and . States of another recurrence of pilonidal cyst in the tailbone. Noticed some tenderness yesterday, then today the small bump. Very mild pain, but admits to discomfort when in sitting position.    She is requesting prescription for antibiotic. She initially had this back in March where it was drained, came back last month and was given antibiotic. States last time it was prescribed, the cyst resolved. She has an appointment with General Surgery on .    Future Appointments   Date Time Provider Department Center   2024  2:30 PM En Mak PA-C [facility]      She will be in school today so she might miss the call. Okay to leave voicemail or send MyChart for any recommendation.    MAURY Grover please advise.

## 2024-08-13 NOTE — TELEPHONE ENCOUNTER
Left message that antibiotics have been sent to [facility]     Advised to call back with any questions/ concerns

## 2024-08-19 ENCOUNTER — OFFICE VISIT (OUTPATIENT)
Dept: SURGERY | Facility: CLINIC | Age: 24
End: 2024-08-19
Payer: COMMERCIAL

## 2024-08-19 VITALS — BODY MASS INDEX: 31 KG/M2 | WEIGHT: 173 LBS

## 2024-08-19 DIAGNOSIS — L05.91 CYST NEAR TAILBONE: Primary | ICD-10-CM

## 2024-08-19 PROCEDURE — 99242 OFF/OP CONSLTJ NEW/EST SF 20: CPT

## 2024-08-19 NOTE — H&P
HPI:    Patient ID: Mary Wei is a 24 year old female presenting with   Chief Complaint   Patient presents with    Cyst     Patient here for a pilonidal cyst.  She has three days left on her antibiotics.  Denies pain, drainage.       Mary is a pleasant 25yo female presenting for consultation from Dr. Grace concerning a tailbone cyst. First noticed around March and has become infected three times since then. She has had one previous incision and drainage, and is currently completed a round of antibiotics. She desires excision. Not taking any blood thinners.     History reviewed. No pertinent past medical history.  History reviewed. No pertinent surgical history.  Family History   Problem Relation Age of Onset    Other (Other) Father         Myasthenia Gravis    Other (Other) Other         No Sudden Cardiac Death of Close Relative     Social History     Socioeconomic History    Marital status: Single     Spouse name: Not on file    Number of children: Not on file    Years of education: Not on file    Highest education level: Not on file   Occupational History    Not on file   Tobacco Use    Smoking status: Never     Passive exposure: Never    Smokeless tobacco: Never    Tobacco comments:     No  Households Smokers    Vaping Use    Vaping status: Never Used   Substance and Sexual Activity    Alcohol use: No    Drug use: No    Sexual activity: Not on file   Other Topics Concern     Service Not Asked    Blood Transfusions Not Asked    Caffeine Concern No    Occupational Exposure Not Asked    Hobby Hazards Not Asked    Sleep Concern Not Asked    Stress Concern Not Asked    Weight Concern Not Asked    Special Diet Not Asked    Back Care Not Asked    Exercise Not Asked    Bike Helmet Not Asked    Seat Belt Not Asked    Self-Exams Not Asked    Grew up on a farm Not Asked    History of tanning Not Asked    Outdoor occupation Not Asked    Pt has a pacemaker No    Pt has a defibrillator No    Breast feeding  Not Asked    Reaction to local anesthetic No   Social History Narrative    Not on file     Social Determinants of Health     Financial Resource Strain: Low Risk  (6/6/2024)    Received from [facility]    Overall Financial Resource Strain (CARDIA)     Difficulty of Paying Living Expenses: Not hard at all   Food Insecurity: No Food Insecurity (6/6/2024)    Received from [facility]    Hunger Vital Sign     Worried About Running Out of Food in the Last Year: Never true     Ran Out of Food in the Last Year: Never true   Transportation Needs: No Transportation Needs (6/6/2024)    Received from [facility]    PRAPARE - Transportation     Lack of Transportation (Medical): No     Lack of Transportation (Non-Medical): No   Physical Activity: Insufficiently Active (6/6/2024)    Received from [facility]    Exercise Vital Sign     Days of Exercise per Week: 3 days     Minutes of Exercise per Session: 30 min   Stress: No Stress Concern Present (6/6/2024)    Received from [facility]    [employer] - Occupational Stress Questionnaire     Feeling of Stress : Only a little   Social Connections: Socially Isolated (6/6/2024)    Received from [facility]    Social Connection and Isolation Panel [NHANES]     Frequency of Communication with Friends and Family: More than three times a week     Frequency of Social Gatherings with Friends and Family: Once a week     Attends Gnosticism Services: Never     Active Member of Clubs or Organizations: No     Attends Club or Organization Meetings: Never     Marital Status: Never    Housing Stability: Unknown (6/6/2024)    Received from [facility]    Housing Stability Vital Sign     Unable to Pay for Housing in the Last Year: No     Number of Times Moved in the Last Year: Not on file     Homeless in the Last Year: No       Review of  Systems   Constitutional:  Negative for chills, diaphoresis and fever.   Respiratory:  Negative for shortness of breath.    Cardiovascular:  Negative for chest pain.   Gastrointestinal:  Negative for abdominal pain, nausea and vomiting.   Neurological:  Negative for weakness.           Current Outpatient Medications   Medication Sig Dispense Refill    amoxicillin clavulanate 875-125 MG Oral Tab Take 1 tablet by mouth 2 (two) times daily for 10 days. 20 tablet 0    Fish Oil-Cholecalciferol (FISH OIL + D3 OR) Take by mouth.      Multiple Vitamins-Minerals (MULTI-VITAMIN/MINERALS) Oral Tab Take 1 tablet by mouth daily.      spironolactone 25 MG Oral Tab       Doxycycline Monohydrate 100 MG Oral Cap       Levocetirizine Dihydrochloride 5 MG Oral Tab Take 1 tablet (5 mg total) by mouth every evening. 90 tablet 2       Allergies:No Known Allergies   PHYSICAL EXAM:   Wt 173 lb (78.5 kg)   LMP 07/21/2024 (Approximate)   BMI 30.65 kg/m²   Physical Exam  Constitutional:       General: She is not in acute distress.     Appearance: Normal appearance.   HENT:      Head: Normocephalic and atraumatic.      Left Ear: External ear normal.      Nose: Nose normal.   Pulmonary:      Effort: Pulmonary effort is normal. No respiratory distress.   Skin:     Comments: Small tailbone cyst just right of the midline. Non-tender, non-erythematous. No tracts noted.    Neurological:      Mental Status: She is alert and oriented to person, place, and time.   Psychiatric:         Mood and Affect: Mood normal.         Behavior: Behavior normal.         Thought Content: Thought content normal.         Judgment: Judgment normal.               ASSESSMENT/PLAN:   Diagnoses and all orders for this visit:    Cyst near tailbone    Discussed diagnosis with patient. Plan in-office excision of tailbone cyst. Explained procedure, including risks, complications, and expected follow up. She is understanding and agreeable.          En Mak,  MAURY  8/19/2024

## 2024-08-21 ENCOUNTER — TELEPHONE (OUTPATIENT)
Dept: SURGERY | Facility: CLINIC | Age: 24
End: 2024-08-21

## 2024-08-21 NOTE — TELEPHONE ENCOUNTER
Pt is scheduled for a procedure on 11-20-24 at 1:00 pm.  Any sooner appointments.  Please call pt

## 2024-09-11 ENCOUNTER — OFFICE VISIT (OUTPATIENT)
Dept: SURGERY | Facility: CLINIC | Age: 24
End: 2024-09-11
Payer: COMMERCIAL

## 2024-09-11 DIAGNOSIS — L05.91 CYST NEAR TAILBONE: Primary | ICD-10-CM

## 2024-09-11 PROCEDURE — 88304 TISSUE EXAM BY PATHOLOGIST: CPT

## 2024-09-11 NOTE — PROGRESS NOTES
Procedure Note  The risks, benefits, alternatives and expected recovery were explained.  The pt expressed understanding and wished to proceed with the procedure.      Preop: Pilonidal cyst measuring 0.5 cm x 0.5 cm  Postop:  Same  Procedure: Excision of pilonidal cyst with 8mm punch biopsy and scissors including skin and subcutaneous tissue  Anesthesia:  4 mL of lidocaine with epi  EBL:  Minimal  Description:  The skin was prepped and draped in sterile fashion. The area was anesthetized with lidocaine injection. A 8 mm punch biopsy and scissors were utilized to excise pilonidal cyst. Hemostasis was maintained. 4-0 Ethilon was used to suture incision closed. The patient tolerated the procedure well. The wound was dressed with gauze and tape.     RAND Dunlap  9/11/24  2:17 PM

## 2024-09-16 ENCOUNTER — PATIENT MESSAGE (OUTPATIENT)
Dept: SURGERY | Facility: CLINIC | Age: 24
End: 2024-09-16

## 2024-09-17 NOTE — TELEPHONE ENCOUNTER
Per patient states appointment is needed for next week to remove stitches, no openings available. Patient states she also has medical question, will explain further to RN. Please call thank you.

## 2024-09-27 ENCOUNTER — NURSE ONLY (OUTPATIENT)
Dept: SURGERY | Facility: CLINIC | Age: 24
End: 2024-09-27
Payer: COMMERCIAL

## 2024-09-27 VITALS — DIASTOLIC BLOOD PRESSURE: 61 MMHG | SYSTOLIC BLOOD PRESSURE: 116 MMHG

## 2024-09-27 DIAGNOSIS — Z48.02 VISIT FOR SUTURE REMOVAL: ICD-10-CM

## 2024-09-27 DIAGNOSIS — Z48.89 ENCOUNTER FOR POSTOPERATIVE CARE: Primary | ICD-10-CM

## 2024-09-27 PROCEDURE — 3078F DIAST BP <80 MM HG: CPT

## 2024-09-27 PROCEDURE — 99024 POSTOP FOLLOW-UP VISIT: CPT

## 2024-09-27 PROCEDURE — 3074F SYST BP LT 130 MM HG: CPT

## 2024-09-27 NOTE — PROGRESS NOTES
Follow Up Visit Note       Active Problems      No diagnosis found.      Chief Complaint   Chief Complaint   Patient presents with    Post-Op     Pt here for post op s/p exc. Of pilonidal cyst on 9/11/2024.  Pt states here for suture removal.           History of Present Illness  Brigitte is a pleasant 24-year-old patient presenting for postop appointment.  She generally feels well.  She does not have incision site pain.  She notes minimal drainage from the area.  She has been keeping the area clean and dressed since she sits most of the day.  She denies fever or chills.      Allergies  Mary has No Known Allergies.    Past Medical / Surgical / Social / Family History    The past medical and past surgical history have been reviewed by me today.    No past medical history on file.  Past Surgical History:   Procedure Laterality Date    Other surgical history  09/11/2024    exc of pilonidal cyst       The family history and social history have been reviewed by me today.    Family History   Problem Relation Age of Onset    Other (Other) Father         Myasthenia Gravis    Other (Other) Other         No Sudden Cardiac Death of Close Relative     Social History     Socioeconomic History    Marital status: Single   Tobacco Use    Smoking status: Never     Passive exposure: Never    Smokeless tobacco: Never    Tobacco comments:     No  Households Smokers    Vaping Use    Vaping status: Never Used   Substance and Sexual Activity    Alcohol use: No    Drug use: No   Other Topics Concern    Caffeine Concern No    Pt has a pacemaker No    Pt has a defibrillator No    Reaction to local anesthetic No        Current Outpatient Medications:     Fish Oil-Cholecalciferol (FISH OIL + D3 OR), Take by mouth., Disp: , Rfl:     Multiple Vitamins-Minerals (MULTI-VITAMIN/MINERALS) Oral Tab, Take 1 tablet by mouth daily., Disp: , Rfl:     spironolactone 25 MG Oral Tab, , Disp: , Rfl:     Doxycycline Monohydrate 100 MG Oral Cap, , Disp: , Rfl:      Levocetirizine Dihydrochloride 5 MG Oral Tab, Take 1 tablet (5 mg total) by mouth every evening., Disp: 90 tablet, Rfl: 2     Review of Systems  The Review of Systems has been reviewed by me during today.  Review of Systems   Constitutional:  Negative for chills, fatigue and fever.   Respiratory:  Negative for shortness of breath.    Cardiovascular:  Negative for chest pain and leg swelling.   Gastrointestinal:  Negative for abdominal pain, constipation, diarrhea, nausea and vomiting.        Physical Findings   /61   LMP 07/21/2024 (Approximate)   Physical Exam  Constitutional:       General: She is not in acute distress.     Appearance: She is not ill-appearing.   HENT:      Head: Normocephalic and atraumatic.   Eyes:      Extraocular Movements: Extraocular movements intact.      Conjunctiva/sclera: Conjunctivae normal.      Pupils: Pupils are equal, round, and reactive to light.   Abdominal:      General: Abdomen is flat. There is no distension.      Palpations: Abdomen is soft.      Tenderness: There is no abdominal tenderness.   Genitourinary:     Comments: Incision site with pink granulation tissue and small opening tract.  sutures removed, silver nitrate applied, packing inserted and dressed.   Skin:     General: Skin is warm.   Neurological:      Mental Status: She is alert.          Assessment   No diagnosis found.    Plan   Continue good hygiene of incision area, keep clean and dry  Keep packing in place for 2 days and remove.  Follow-up in clinic in 1 week for incision check.        No orders of the defined types were placed in this encounter.      Imaging & Referrals   None    Follow Up  No follow-ups on file.    RAND Dunlap

## 2024-10-04 ENCOUNTER — NURSE ONLY (OUTPATIENT)
Dept: SURGERY | Facility: CLINIC | Age: 24
End: 2024-10-04
Payer: COMMERCIAL

## 2024-10-04 VITALS
DIASTOLIC BLOOD PRESSURE: 50 MMHG | BODY MASS INDEX: 30.65 KG/M2 | SYSTOLIC BLOOD PRESSURE: 105 MMHG | HEART RATE: 90 BPM | WEIGHT: 173 LBS | HEIGHT: 63 IN

## 2024-10-04 DIAGNOSIS — Z48.89 ENCOUNTER FOR POSTOPERATIVE CARE: Primary | ICD-10-CM

## 2024-10-04 PROCEDURE — 3008F BODY MASS INDEX DOCD: CPT

## 2024-10-04 PROCEDURE — 3074F SYST BP LT 130 MM HG: CPT

## 2024-10-04 PROCEDURE — 3078F DIAST BP <80 MM HG: CPT

## 2024-10-04 PROCEDURE — 99024 POSTOP FOLLOW-UP VISIT: CPT

## 2024-10-04 NOTE — PROGRESS NOTES
Follow Up Visit Note       Active Problems      1. Encounter for postoperative care          Chief Complaint   Chief Complaint   Patient presents with    Follow - Up     Patient is here for follow up Cyst on the tailbone.  Patient was seen in the office 8-.  Patient reports still having decreasing discharge.  Patient is Physical therapy student.           History of Present Illness  Mary is a pleasant 24-year-old patient presenting for follow-up on wound after excision of tailbone cyst.  She feels the wound is healing and has improved.  She keeps a dressing on it daily and has minimal drainage on the dressing at the end of the day.  She endorses good hygiene of the area.  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      Allergies  Mary has No Known Allergies.    Past Medical / Surgical / Social / Family History    The past medical and past surgical history have been reviewed by me today.    No past medical history on file.  Past Surgical History:   Procedure Laterality Date    Other surgical history  09/11/2024    exc of pilonidal cyst       The family history and social history have been reviewed by me today.    Family History   Problem Relation Age of Onset    Other (Other) Father         Myasthenia Gravis    Other (Other) Other         No Sudden Cardiac Death of Close Relative     Social History     Socioeconomic History    Marital status: Single   Tobacco Use    Smoking status: Never     Passive exposure: Never    Smokeless tobacco: Never    Tobacco comments:     No  Households Smokers    Vaping Use    Vaping status: Never Used   Substance and Sexual Activity    Alcohol use: No    Drug use: No   Other Topics Concern    Caffeine Concern No    Pt has a pacemaker No    Pt has a defibrillator No    Reaction to local anesthetic No        Current Outpatient Medications:     Fish Oil-Cholecalciferol (FISH OIL + D3 OR), Take by mouth., Disp: , Rfl:     Multiple Vitamins-Minerals  (MULTI-VITAMIN/MINERALS) Oral Tab, Take 1 tablet by mouth daily., Disp: , Rfl:     spironolactone 25 MG Oral Tab, , Disp: , Rfl:     Doxycycline Monohydrate 100 MG Oral Cap, , Disp: , Rfl:     Levocetirizine Dihydrochloride 5 MG Oral Tab, Take 1 tablet (5 mg total) by mouth every evening., Disp: 90 tablet, Rfl: 2     Review of Systems  The Review of Systems has been reviewed by me during today.  Review of Systems   Constitutional:  Negative for chills, fatigue and fever.   Respiratory:  Negative for shortness of breath.    Cardiovascular:  Negative for chest pain and leg swelling.   Gastrointestinal:  Negative for abdominal pain, constipation, diarrhea, nausea and vomiting.        Physical Findings   /50 (BP Location: Left arm)   Pulse 90   Ht 5' 3\" (1.6 m)   Wt 173 lb (78.5 kg)   LMP 07/21/2024 (Approximate)   BMI 30.65 kg/m²   Physical Exam  Constitutional:       General: She is not in acute distress.     Appearance: She is not ill-appearing.   HENT:      Head: Normocephalic and atraumatic.   Eyes:      Extraocular Movements: Extraocular movements intact.      Conjunctiva/sclera: Conjunctivae normal.      Pupils: Pupils are equal, round, and reactive to light.   Abdominal:      General: Abdomen is flat. There is no distension.      Palpations: Abdomen is soft.      Tenderness: There is no abdominal tenderness.   Skin:     General: Skin is warm.      Comments: Incision site with pink granulation tissue, tract smaller in size compared to last physical exam.  Silver nitrate applied and dressings placed.   Neurological:      Mental Status: She is alert.          Assessment   1. Encounter for postoperative care        Plan   Continue good hygiene of the area, no need for daily dressings.  Allow airflow as able, keep site clean and dry.  Improved healing of wound  Follow up as needed       No orders of the defined types were placed in this encounter.      Imaging & Referrals   None    Follow Up  No follow-ups  on file.    RAND Dunlap

## 2024-11-26 ENCOUNTER — NURSE ONLY (OUTPATIENT)
Dept: INTERNAL MEDICINE CLINIC | Facility: HOSPITAL | Age: 24
End: 2024-11-26
Attending: PREVENTIVE MEDICINE

## 2024-11-26 DIAGNOSIS — Z00.00 WELLNESS EXAMINATION: Primary | ICD-10-CM

## 2024-11-26 PROCEDURE — 86480 TB TEST CELL IMMUN MEASURE: CPT

## 2024-11-29 LAB
M TB IFN-G CD4+ T-CELLS BLD-ACNC: 0 IU/ML
M TB TUBERC IFN-G BLD QL: NEGATIVE
M TB TUBERC IGNF/MITOGEN IGNF CONTROL: 8.44 IU/ML
QFT TB1 AG MINUS NIL: 0 IU/ML
QFT TB2 AG MINUS NIL: 0 IU/ML

## 2024-12-07 ENCOUNTER — OFFICE VISIT (OUTPATIENT)
Dept: FAMILY MEDICINE CLINIC | Facility: CLINIC | Age: 24
End: 2024-12-07
Payer: COMMERCIAL

## 2024-12-07 ENCOUNTER — HOSPITAL ENCOUNTER (OUTPATIENT)
Dept: GENERAL RADIOLOGY | Age: 24
Discharge: HOME | End: 2024-12-07
Attending: FAMILY MEDICINE
Payer: COMMERCIAL

## 2024-12-07 VITALS
HEIGHT: 63 IN | WEIGHT: 172 LBS | SYSTOLIC BLOOD PRESSURE: 117 MMHG | RESPIRATION RATE: 17 BRPM | DIASTOLIC BLOOD PRESSURE: 78 MMHG | BODY MASS INDEX: 30.48 KG/M2 | HEART RATE: 76 BPM

## 2024-12-07 DIAGNOSIS — L05.92 PILONIDAL SINUS: ICD-10-CM

## 2024-12-07 DIAGNOSIS — M79.671 RIGHT FOOT PAIN: Primary | ICD-10-CM

## 2024-12-07 DIAGNOSIS — M79.671 RIGHT FOOT PAIN: ICD-10-CM

## 2024-12-07 PROCEDURE — 73630 X-RAY EXAM OF FOOT: CPT | Performed by: FAMILY MEDICINE

## 2024-12-07 PROCEDURE — 3008F BODY MASS INDEX DOCD: CPT | Performed by: FAMILY MEDICINE

## 2024-12-07 PROCEDURE — 99213 OFFICE O/P EST LOW 20 MIN: CPT | Performed by: FAMILY MEDICINE

## 2024-12-07 PROCEDURE — 3074F SYST BP LT 130 MM HG: CPT | Performed by: FAMILY MEDICINE

## 2024-12-07 PROCEDURE — 3078F DIAST BP <80 MM HG: CPT | Performed by: FAMILY MEDICINE

## 2024-12-07 NOTE — PROGRESS NOTES
Blood pressure 117/78, pulse 76, resp. rate 17, height 5' 3\" (1.6 m), weight 172 lb (78 kg), last menstrual period 07/21/2024, not currently breastfeeding.      History of pilonidal sinus complaining of gluteal cleft pain for the past 2 weeks.  It is inferior to the last pilonidal sinus.  Denies fever chills or vomiting.  Also fell and injured her left foot 2 days ago.  Some bruising also pain.    Objective gluteal cleft with palpable tender excoriation noted    Assessment #1 pilonidal sinus #2 foot injury    Plan #1 incision and drainage packing referral to general surgery #2 x-ray order entered

## 2024-12-07 NOTE — PROCEDURES
Incision and drainage using number 18-gauge needle    [facility]AF[facility] GUILLEN ASSISTING     No pus extracted packing placed    Iodoform gauze packing    Follow-up in 2 days sitz bath's advised

## 2024-12-09 ENCOUNTER — OFFICE VISIT (OUTPATIENT)
Dept: FAMILY MEDICINE CLINIC | Facility: CLINIC | Age: 24
End: 2024-12-09
Payer: COMMERCIAL

## 2024-12-09 VITALS
HEART RATE: 80 BPM | SYSTOLIC BLOOD PRESSURE: 109 MMHG | HEIGHT: 63 IN | WEIGHT: 174 LBS | DIASTOLIC BLOOD PRESSURE: 67 MMHG | BODY MASS INDEX: 30.83 KG/M2

## 2024-12-09 DIAGNOSIS — L05.92 PILONIDAL SINUS: Primary | ICD-10-CM

## 2024-12-09 PROCEDURE — 3074F SYST BP LT 130 MM HG: CPT | Performed by: FAMILY MEDICINE

## 2024-12-09 PROCEDURE — 3008F BODY MASS INDEX DOCD: CPT | Performed by: FAMILY MEDICINE

## 2024-12-09 PROCEDURE — 99212 OFFICE O/P EST SF 10 MIN: CPT | Performed by: FAMILY MEDICINE

## 2024-12-09 PROCEDURE — 3078F DIAST BP <80 MM HG: CPT | Performed by: FAMILY MEDICINE

## 2024-12-10 ENCOUNTER — TELEPHONE (OUTPATIENT)
Dept: FAMILY MEDICINE CLINIC | Facility: CLINIC | Age: 24
End: 2024-12-10

## 2024-12-10 NOTE — PROGRESS NOTES
Blood pressure 109/67, pulse 80, height 5' 3\" (1.6 m), weight 174 lb (78.9 kg), last menstrual period 07/21/2024, not currently breastfeeding.      Presents today following up for pilonidal sinus.  Reports pain is tolerable.    Objective    Pilonidal sinus with packing placed    Assessment pilonidal sinus    Plan packing removed    Aerobic culture negative anaerobic culture result pending    Will follow up with surgery referral answered    Has appointment with me in 2 weeks    Continue sitz baths twice daily

## 2024-12-10 NOTE — TELEPHONE ENCOUNTER
----- Message from Skinny Grace sent at 12/10/2024  2:57 PM CST -----  Culture results not final but site suspicious for anaerobic infection Augmentin prescription at pharmacy patient to start taking it today

## 2024-12-18 ENCOUNTER — OFFICE VISIT (OUTPATIENT)
Dept: SURGERY | Facility: CLINIC | Age: 24
End: 2024-12-18

## 2024-12-18 VITALS
DIASTOLIC BLOOD PRESSURE: 67 MMHG | WEIGHT: 174 LBS | SYSTOLIC BLOOD PRESSURE: 112 MMHG | HEART RATE: 98 BPM | BODY MASS INDEX: 31 KG/M2

## 2024-12-18 DIAGNOSIS — L73.9 FOLLICULITIS: Primary | ICD-10-CM

## 2024-12-18 PROCEDURE — 3078F DIAST BP <80 MM HG: CPT | Performed by: SURGERY

## 2024-12-18 PROCEDURE — 99203 OFFICE O/P NEW LOW 30 MIN: CPT | Performed by: SURGERY

## 2024-12-18 PROCEDURE — 3074F SYST BP LT 130 MM HG: CPT | Performed by: SURGERY

## 2024-12-18 RX ORDER — CHLORHEXIDINE GLUCONATE 40 MG/ML
236 SOLUTION TOPICAL
Qty: 1 EACH | Refills: 0 | Status: SHIPPED | OUTPATIENT
Start: 2024-12-18 | End: 2024-12-25

## 2024-12-18 RX ORDER — BACITRACIN ZINC AND POLYMYXIN B SULFATE 500; 1000 [USP'U]/G; [USP'U]/G
1 OINTMENT TOPICAL 2 TIMES DAILY
Qty: 15 G | Refills: 0 | Status: SHIPPED | OUTPATIENT
Start: 2024-12-18

## 2024-12-18 NOTE — H&P
History and Physical      HPI     Chief Complaint   Patient presents with    Pilonidal Cyst     Patient here for complaints of a pilonidal cyst.  States it is uncomfortable.  Was opened on 12/14 and drained.  She is taking antibiotics at this time.  Denies drainage       HPI  Mary Wei is a 24 year old female who presents with follow-up for a perianal wound.  She reportedly had a pilonidal lanced and drain at the superior aspect of her anococcygeal cleft.  That is healed and she has no complaints.  She had a punch biopsy excision of a area just superior to her anal verge.  Small ulceration present.  She has significant buttock folliculitis with pustules.  Currently on Augmentin.    History reviewed. No pertinent past medical history.  Past Surgical History:   Procedure Laterality Date    Other surgical history  09/11/2024    exc of pilonidal cyst     Current Outpatient Medications   Medication Sig Dispense Refill    CHLORHEXIDINE 4 % External Solution Apply 236 mL topically daily as needed (Please shower from neck to feet with Hibiclens once a day for 1 week). Apply to skin from chin downward, leave on skin 3 minutes, rinse completely and towel dry the night before & morning of surgery. 1 each 0    Bacitracin-Polymyxin B 500-22320 UNIT/GM External Ointment Apply 1 Application topically 2 (two) times daily. 15 g 0    amoxicillin clavulanate 875-125 MG Oral Tab Take 1 tablet by mouth 2 (two) times daily for 10 days. 20 tablet 0    Fish Oil-Cholecalciferol (FISH OIL + D3 OR) Take by mouth.      Multiple Vitamins-Minerals (MULTI-VITAMIN/MINERALS) Oral Tab Take 1 tablet by mouth daily.      spironolactone 25 MG Oral Tab       Levocetirizine Dihydrochloride 5 MG Oral Tab Take 1 tablet (5 mg total) by mouth every evening. 90 tablet 2     ALLERGIES  Allergies[1]    Social History     Socioeconomic History    Marital status: Single   Tobacco Use    Smoking status: Never     Passive exposure: Never    Smokeless tobacco:  Never    Tobacco comments:     No  Households Smokers    Vaping Use    Vaping status: Never Used   Substance and Sexual Activity    Alcohol use: No    Drug use: No     Family History   Problem Relation Age of Onset    Other (Other) Father         Myasthenia Gravis    Other (Other) Other         No Sudden Cardiac Death of Close Relative       Review of Systems   A comprehensive 10 point review of systems was completed.  Pertinent positives and negatives noted in the the HPI.    PHYSICAL EXAM   /67 (BP Location: Left arm, Patient Position: Sitting, Cuff Size: adult)   Pulse 98   Wt 174 lb (78.9 kg)   LMP 07/21/2024 (Approximate)   BMI 30.82 kg/m²  Patient's last menstrual period was 07/21/2024 (approximate).   Constitutional: appears well hydrated alert and responsive no acute distress noted  Head/Face: normocephalic  Nose/Mouth/Throat: nose and throat are clear palate is intact mucous membranes are moist no oral lesions are noted  Neck/Thyroid: neck is supple without adenopathy  Respiratory: normal to inspection lungs are clear to auscultation bilaterally normal respiratory effort  Cardiovascular: regular rate and rhythm no murmurs, gallups, or rubs  Abdomen: soft non-tender non-distended no organomegaly noted no masses  Extremities: no edema, cyanosis, or clubbing  Neurological: exam appropriate for age reflexes and motor skills appropriate for age    Anal-pilonidal site of I&D healed no complaints.  Bilateral buttock folliculitis with pustules  ASSESSMENT/PLAN   Assessment   Encounter Diagnosis   Name Primary?    Folliculitis Yes       24 year old female with folliculitis  We have discussed the surgical risks, benefits, alternatives, and expected recovery. We will plan topical antibiotic to open wound twice a day for 1 week Hibiclens showers head to toe to decontaminate her skin. All of the patient's questions have been answered to her satisfaction.       12/18/2024  Ryder Evangelista MD               [1] No  Known Allergies

## 2024-12-18 NOTE — PATIENT INSTRUCTIONS
Shower with Hibiclens from shin to toes once a day for 7 days to decontaminate your skin    Continue your Augmentin until finished.    Apply bacitracin ointment to the area above your anus twice a day for 1 week    Follow-up for problems

## 2024-12-23 ENCOUNTER — OFFICE VISIT (OUTPATIENT)
Dept: FAMILY MEDICINE CLINIC | Facility: CLINIC | Age: 24
End: 2024-12-23
Payer: COMMERCIAL

## 2024-12-23 VITALS
DIASTOLIC BLOOD PRESSURE: 68 MMHG | BODY MASS INDEX: 31.01 KG/M2 | HEART RATE: 80 BPM | HEIGHT: 63 IN | WEIGHT: 175 LBS | SYSTOLIC BLOOD PRESSURE: 109 MMHG

## 2024-12-23 DIAGNOSIS — E55.9 VITAMIN D DEFICIENCY: ICD-10-CM

## 2024-12-23 DIAGNOSIS — Z00.00 ROUTINE PHYSICAL EXAMINATION: Primary | ICD-10-CM

## 2024-12-23 RX ORDER — DOXYCYCLINE HYCLATE 100 MG
100 TABLET ORAL 2 TIMES DAILY
COMMUNITY
Start: 2024-12-23

## 2024-12-24 NOTE — PROGRESS NOTES
REASON FOR VISIT:    Mary Wei is a 24 year old female who presents for an Annual Health Assessment.        Patient Active Problem List   Diagnosis    Encounter for routine child health examination without abnormal findings    Excessive bleeding in premenopausal period    Lactose intolerance    Acne vulgaris    Eczema     General Health     At any time do you feel concerned for the safety/well-being of yourself and/or your children, in your home or elsewhere?: No     CAGE:           Depression Screening (PHQ-2/PHQ-9):  Over the LAST 2 WEEKS             PREVENTATIVE SERVICES  INDICATIONS AND SCHEDULE Recommendation Internal Lab or Procedure   Colonoscopy Screen Every 10 years No recommendations at this time   Flex Sigmoidoscopy Screen  Every 5 years No results found for this or any previous visit.   Fecal Occult Blood  Annually No results found for: \"FOB\", \"OCCULTSTOOL\"   Obesity Screening Screen all adults annually Body mass index is 31 kg/m².     Preventive Services for Which Recommendations Vary with Risk Recommendation Internal Lab or Procedure   Cholesterol Screening Recommended screening varies with age, risk and gender LDL Cholesterol (mg/dL)   Date Value   09/29/2023 77      Diabetes Screening  If history of high blood pressure or other  risk factors No results found for: \"A1C\"  Glucose (mg/dL)   Date Value   09/29/2023 86        Gonorrhea Screening If high risk No results found for: \"GONOCOCCUS\"   HIV Screening For all adults age 18-65, older adults at increased risk No results found for: \"HIV\"   Syphilis Screening Screen if pregnant or high risk No results found for: \"RPR\"   Hepatitis C Screening Screen pts at high risk plus screen one time for adults born 1945 - 1965 No results found for: \"HCVAB\"   Tuberculosis Screen If high risk No components found for: \"PPDINDURAT\"       SPECIFIC DISEASE MONITORING Internal Lab or Procedure   No disease specific diagnoses    ALLERGIES:   Allergies[1]  CURRENT  MEDICATIONS:   Current Outpatient Medications   Medication Sig Dispense Refill    Doxycycline Hyclate 100 MG Oral Tab Take 1 tablet (100 mg total) by mouth 2 (two) times daily.      Fish Oil-Cholecalciferol (FISH OIL + D3 OR) Take by mouth.      Multiple Vitamins-Minerals (MULTI-VITAMIN/MINERALS) Oral Tab Take 1 tablet by mouth daily.      spironolactone 25 MG Oral Tab       Levocetirizine Dihydrochloride 5 MG Oral Tab Take 1 tablet (5 mg total) by mouth every evening. 90 tablet 2    CHLORHEXIDINE 4 % External Solution Apply 236 mL topically daily as needed (Please shower from neck to feet with Hibiclens once a day for 1 week). Apply to skin from chin downward, leave on skin 3 minutes, rinse completely and towel dry the night before & morning of surgery. 1 each 0    Bacitracin-Polymyxin B [id number] UNIT/GM External Ointment Apply 1 Application topically 2 (two) times daily. 15 g 0      MEDICAL INFORMATION:   History reviewed. No pertinent past medical history.   Past Surgical History:   Procedure Laterality Date    Other surgical history  09/11/2024    I & D OF PILONIDAL SINUS      Family History   Problem Relation Age of Onset    Other (Other) Father         Myasthenia Gravis    Other (Other) Other         No Sudden Cardiac Death of Close Relative     NO FAMILY HISTORY OF BREAST OR COLON CANCER.     SOCIAL HISTORY:   Social History     Socioeconomic History    Marital status: Single   Tobacco Use    Smoking status: Never     Passive exposure: Never    Smokeless tobacco: Never    Tobacco comments:     No  Households Smokers    Vaping Use    Vaping status: Never Used   Substance and Sexual Activity    Alcohol use: No    Drug use: No   Other Topics Concern    Caffeine Concern No    Pt has a pacemaker No    Pt has a defibrillator No    Reaction to local anesthetic No     Social Drivers of Health     Financial Resource Strain: Low Risk  (6/6/2024)    Received from [facility]    Overall Financial  Resource Strain (CARDIA)     Difficulty of Paying Living Expenses: Not hard at all   Food Insecurity: No Food Insecurity (6/6/2024)    Received from [facility]    Hunger Vital Sign     Worried About Running Out of Food in the Last Year: Never true     Ran Out of Food in the Last Year: Never true   Transportation Needs: No Transportation Needs (6/6/2024)    Received from [facility]    PRAPARE - Transportation     Lack of Transportation (Medical): No     Lack of Transportation (Non-Medical): No   Physical Activity: Insufficiently Active (6/6/2024)    Received from [facility]    Exercise Vital Sign     Days of Exercise per Week: 3 days     Minutes of Exercise per Session: 30 min   Stress: No Stress Concern Present (6/6/2024)    Received from [facility]    [employer] [place] - Occupational Stress Questionnaire     Feeling of Stress : Only a little   Social Connections: Socially Isolated (6/6/2024)    Received from [facility]    Social Connection and Isolation Panel [NHANES]     Frequency of Communication with Friends and Family: More than three times a week     Frequency of Social Gatherings with Friends and Family: Once a week     Attends Yazdanism Services: Never     Active Member of Clubs or Organizations: No     Attends Club or Organization Meetings: Never     Marital Status: Never    Housing Stability: Unknown (6/6/2024)    Received from [facility]    Housing Stability Vital Sign     Unable to Pay for Housing in the Last Year: No     Homeless in the Last Year: No     Occ:  :       REVIEW OF SYSTEMS:   GENERAL: feels well otherwise  SKIN: denies any unusual skin lesions  EYES: denies blurred vision or double vision  HEENT: denies nasal congestion, sinus pain or ST  LUNGS: denies shortness of breath with exertion  CARDIOVASCULAR: denies chest pain on exertion  GI:  denies abdominal pain, denies heartburn  : denies dysuria, vaginal discharge or itching, periods regular   MUSCULOSKELETAL: denies back pain  NEURO: denies headaches  PSYCHE: denies depression HAS anxiety goes to therapy does not want medication.  HEMATOLOGIC: denies hx of anemia  ENDOCRINE: denies thyroid history  ALL/ASTHMA: denies hx of allergy or asthma  NO BLOOD IN STOOL      EXAM:   /68   Pulse 80   Ht 5' 3\" (1.6 m)   Wt 175 lb (79.4 kg)   LMP 07/21/2024 (Approximate)   BMI 31.00 kg/m²   >   BP Readings from Last 3 Encounters:   12/23/24 109/68   12/18/24 112/67   12/09/24 109/67     Patient's last menstrual period was 07/21/2024 (approximate).  GENERAL: well developed, well nourished, in no apparent distress, obese  SKIN: no rashes, no suspicious lesions  HEENT: atraumatic, normocephalic, ears and throat are clear  EYES:PERRLA, EOMI, conjunctiva are clear.    NECK: supple, no adenopathy, no bruits  CHEST: no chest tenderness  BREAST: deferred   LUNGS: clear to auscultation  CARDIO: RRR without murmur  GI: good BS's, no masses, HSM or tenderness  :deferred  RECTAL: deferred  MUSCULOSKELETAL: back is not tender, FROM of the back  EXTREMITIES: no cyanosis, clubbing or edema  NEURO: Oriented times three, cranial nerves are intact, motor and sensory are grossly intact      ASSESSMENT AND OTHER RELEVANT CHRONIC CONDITIONS:   Mary Wei is a 24 year old female who presents for an Annual Health Assessment.     PLAN SUMMARY:   Diagnoses and all orders for this visit:    Routine physical examination  -     Vitamin D; Future  -     Lipid Panel; Future  -     Glucose, Serum; Future  -     TSH W Reflex To Free T4; Future    Vitamin D deficiency  -     Vitamin D; Future       The patient indicates understanding of these issues and agrees to the plan.  No follow-ups on file.  Exercise counseling perfomed    SUGGESTED VACCINATIONS - Influenza, Pneumococcal, Zoster, Tetanus, HPV   Influenza: Influenza  Vaccine(1) due on 10/01/2024  Pneumonia: No recommendations at this time  HPV: No recommendations at this time  Tdap: No recommendations at this time  Shingles:      Influenza Annually   Pneumococcal if high risk   Td/Tdap once then every 10 years   HPV Males 11-21   Zoster (Shingles) 60 and older: one dose   Varicella 2 doses if not immune   MMR 1-2 doses if born after 1955 and not immune     1. Routine physical examination    - Vitamin D; Future  - Lipid Panel; Future  - Glucose, Serum; Future  - TSH W Reflex To Free T4; Future    2. Vitamin D deficiency    - Vitamin D; Future           [1] No Known Allergies

## 2025-02-03 ENCOUNTER — LAB ENCOUNTER (OUTPATIENT)
Dept: LAB | Age: 25
End: 2025-02-03
Attending: FAMILY MEDICINE
Payer: COMMERCIAL

## 2025-02-03 ENCOUNTER — OFFICE VISIT (OUTPATIENT)
Dept: FAMILY MEDICINE CLINIC | Facility: CLINIC | Age: 25
End: 2025-02-03

## 2025-02-03 ENCOUNTER — TELEPHONE (OUTPATIENT)
Dept: FAMILY MEDICINE CLINIC | Facility: CLINIC | Age: 25
End: 2025-02-03

## 2025-02-03 VITALS
BODY MASS INDEX: 30.3 KG/M2 | DIASTOLIC BLOOD PRESSURE: 79 MMHG | SYSTOLIC BLOOD PRESSURE: 110 MMHG | WEIGHT: 171 LBS | HEART RATE: 85 BPM | HEIGHT: 63 IN

## 2025-02-03 DIAGNOSIS — Z00.00 ROUTINE PHYSICAL EXAMINATION: ICD-10-CM

## 2025-02-03 DIAGNOSIS — E55.9 VITAMIN D DEFICIENCY: Primary | ICD-10-CM

## 2025-02-03 DIAGNOSIS — E55.9 VITAMIN D DEFICIENCY: ICD-10-CM

## 2025-02-03 LAB
ALT SERPL-CCNC: 13 U/L
ANION GAP SERPL CALC-SCNC: 9 MMOL/L (ref 0–18)
AST SERPL-CCNC: 14 U/L (ref ?–34)
BUN BLD-MCNC: 9 MG/DL (ref 9–23)
BUN/CREAT SERPL: 13.6 (ref 10–20)
CALCIUM BLD-MCNC: 9 MG/DL (ref 8.7–10.4)
CHLORIDE SERPL-SCNC: 104 MMOL/L (ref 98–112)
CHOLEST SERPL-MCNC: 148 MG/DL (ref ?–200)
CO2 SERPL-SCNC: 28 MMOL/L (ref 21–32)
CREAT BLD-MCNC: 0.66 MG/DL
EGFRCR SERPLBLD CKD-EPI 2021: 126 ML/MIN/1.73M2 (ref 60–?)
FASTING PATIENT LIPID ANSWER: YES
FASTING STATUS PATIENT QL REPORTED: YES
GLUCOSE BLD-MCNC: 90 MG/DL (ref 70–99)
HDLC SERPL-MCNC: 44 MG/DL (ref 40–59)
LDLC SERPL CALC-MCNC: 93 MG/DL (ref ?–100)
NONHDLC SERPL-MCNC: 104 MG/DL (ref ?–130)
OSMOLALITY SERPL CALC.SUM OF ELEC: 290 MOSM/KG (ref 275–295)
POTASSIUM SERPL-SCNC: 3.9 MMOL/L (ref 3.5–5.1)
SODIUM SERPL-SCNC: 141 MMOL/L (ref 136–145)
TRIGL SERPL-MCNC: 53 MG/DL (ref 30–149)
TSI SER-ACNC: 1.71 UIU/ML (ref 0.55–4.78)
VIT D+METAB SERPL-MCNC: 31.8 NG/ML (ref 30–100)
VLDLC SERPL CALC-MCNC: 9 MG/DL (ref 0–30)

## 2025-02-03 PROCEDURE — 99213 OFFICE O/P EST LOW 20 MIN: CPT | Performed by: FAMILY MEDICINE

## 2025-02-03 PROCEDURE — 36415 COLL VENOUS BLD VENIPUNCTURE: CPT

## 2025-02-03 PROCEDURE — 84443 ASSAY THYROID STIM HORMONE: CPT

## 2025-02-03 PROCEDURE — 82306 VITAMIN D 25 HYDROXY: CPT

## 2025-02-03 PROCEDURE — 80048 BASIC METABOLIC PNL TOTAL CA: CPT

## 2025-02-03 PROCEDURE — 80061 LIPID PANEL: CPT

## 2025-02-03 PROCEDURE — 84450 TRANSFERASE (AST) (SGOT): CPT

## 2025-02-03 PROCEDURE — 84460 ALANINE AMINO (ALT) (SGPT): CPT

## 2025-02-03 RX ORDER — CEPHALEXIN 500 MG/1
500 CAPSULE ORAL 4 TIMES DAILY
Qty: 40 CAPSULE | Refills: 0 | Status: SHIPPED | OUTPATIENT
Start: 2025-02-03

## 2025-02-03 RX ORDER — MOMETASONE FUROATE 1 MG/G
1 CREAM TOPICAL 2 TIMES DAILY PRN
Qty: 45 G | Refills: 0 | Status: SHIPPED | OUTPATIENT
Start: 2025-02-03

## 2025-02-03 NOTE — PROGRESS NOTES
Blood pressure 110/79, pulse 85, height 5' 3\" (1.6 m), weight 171 lb (77.6 kg), last menstrual period 07/21/2024, not currently breastfeeding.      Feeling discomfort in her gluteal cleft.  History of folliculitis.    Objective    Erythematous discoloration noted gluteal cleft with some tenderness    Slight induration inferiorly    Assessment folliculitis early    Plan cephalexin and Elocon cream    Blood test today patient using spironolactone

## 2025-02-03 NOTE — TELEPHONE ENCOUNTER
Verified name and .    Patient states that she has had tenderness in her tail bone since yesterday.    She denies any redness or swelling at this time.    She is requesting prescription medication.    Appointment scheduled:  Future Appointments   Date Time Provider Department Center   2/3/2025  9:00 AM [facility]

## 2025-02-22 ENCOUNTER — TELEPHONE (OUTPATIENT)
Dept: FAMILY MEDICINE CLINIC | Facility: CLINIC | Age: 25
End: 2025-02-22

## 2025-02-22 NOTE — TELEPHONE ENCOUNTER
Patient advised of note below and was assisted with appointment. Patient aware the immediate care if any worsening symptoms.

## 2025-02-22 NOTE — TELEPHONE ENCOUNTER
Patient states she is having ether folliculitis or Erythematous and would like to know if an antibiotic can be sent again. She was seen for this 02/03/25. Would like to also know if Dr. Grace can referred her to a specialty. Please advise.

## 2025-02-22 NOTE — TELEPHONE ENCOUNTER
Patient states she was seen in the office 2/3/25 and was treated for foliculitis in left gluteal cleft.     Patient took all of the antibiotics prescribed, infection went away, now starting to come back. Patient states she has tenderness and slight redness in same area again. Patient asking if she should be prescribed additional antibiotics or come in again. Please advise.    Routed to Pod mate

## 2025-02-24 ENCOUNTER — OFFICE VISIT (OUTPATIENT)
Dept: FAMILY MEDICINE CLINIC | Facility: CLINIC | Age: 25
End: 2025-02-24
Payer: COMMERCIAL

## 2025-02-24 VITALS
OXYGEN SATURATION: 100 % | BODY MASS INDEX: 30.35 KG/M2 | DIASTOLIC BLOOD PRESSURE: 77 MMHG | TEMPERATURE: 99 F | RESPIRATION RATE: 20 BRPM | SYSTOLIC BLOOD PRESSURE: 124 MMHG | HEART RATE: 96 BPM | WEIGHT: 171.31 LBS | HEIGHT: 63 IN

## 2025-02-24 DIAGNOSIS — L73.9 FOLLICULITIS: Primary | ICD-10-CM

## 2025-02-24 PROCEDURE — 99213 OFFICE O/P EST LOW 20 MIN: CPT | Performed by: FAMILY MEDICINE

## 2025-02-24 RX ORDER — SULFAMETHOXAZOLE AND TRIMETHOPRIM 800; 160 MG/1; MG/1
1 TABLET ORAL 2 TIMES DAILY
Qty: 20 TABLET | Refills: 0 | Status: SHIPPED | OUTPATIENT
Start: 2025-02-24 | End: 2025-03-06

## 2025-02-24 NOTE — PROGRESS NOTES
Blood pressure 124/77, pulse 96, temperature 98.5 °F (36.9 °C), temperature source Temporal, resp. rate 20, height 5' 3\" (1.6 m), weight 171 lb 4.8 oz (77.7 kg), last menstrual period 02/14/2025, SpO2 100%, not currently breastfeeding.      Patient presents today following up for discomfort in the gluteal cleft.  Currently on doxycycline for acne.  Started this past weekend.  Patient is going to [place] next week  Objective    Mild erythema mild tenderness noted gluteal cleft no discharge no fluctuant masses or tenderness    Assessment folliculitis    Plan continue mometasone Bactrim prescription    Follow-up with Dr. MAJANO

## 2025-02-27 NOTE — LETTER
AUTHORIZATION FOR SURGICAL OPERATION OR OTHER PROCEDURE    1. I hereby authorize Dr. Skinny Grace, and [facility] staff assigned to my case to perform the following operation and/or procedure at the [facility] site:    _______________________________________________________________________________________________    Incision possible  drainage   _______________________________________________________________________________________________    2.  My physician has explained the nature and purpose of the operation or other procedure, possible alternative methods of treatment, the risks involved, and the possibility of complication to me.  I acknowledge that no guarantee has been made as to the result that may be obtained.  3.  I recognize that, during the course of this operation, or other procedure, unforseen conditions may necessitate additional or different procedure than those listed above.  I, therefore, further authorize and request that the above named physician, his/her physician assistants or designees perform such procedures as are, in his/her professional opinion, necessary and desirable.  4.  Any tissue or organs removed in the operation or other procedure may be disposed of by and at the discretion of the [facility] and [facility].  5.  I understand that in the event of a medical emergency, I will be transported by local paramedics to [facility] or other hospital emergency department.  6.  I certify that I have read and fully understand the above consent to operation and/or other procedure.    7.  I acknowledge that my physician has explained sedation/analgesia administration to me including the risks and benefits.  I consent to the administration of sedation/analgesia as may be necessary or desirable in the judgement of my physician.    Witness signature: ___________________________________________________ Date:   ______/______/_____                    Time:  ________ A.M.  P.M.       Patient Name:  ______________________________________________________  (please print)      Patient signature:  ___________________________________________________             Relationship to Patient:           []  Parent    Responsible person                          []  Spouse  In case of minor or                    [] Other  _____________   Incompetent name:  __________________________________________________                               (please print)      _____________      Responsible person  In case of minor or  Incompetent signature:  _______________________________________________    Statement of Physician  My signature below affirms that prior to the time of the procedure, I have explained to the patient and/or his/her guardian, the risks and benefits involved in the proposed treatment and any reasonable alternative to the proposed treatment.  I have also explained the risks and benefits involved in the refusal of the proposed treatment and have answered the patient's questions.                        Date:  ______/______/_______  Provider                      Signature:  __________________________________________________________       Time:  ___________ A.M    P.M.      [Annual Wellness Visit] : an annual wellness visit

## 2025-03-17 NOTE — TELEPHONE ENCOUNTER
[facility]  Consult Note  3/17/2025               CONSULTATION - REFERRAL  Allie Lujan APNP      ASSESSMENT:  Chronic lower back pain with bilateral sciatica  Numbness and tingling of bilateral lower extremities and feet   Lumbar radiculopathy  Degenerative disc disease  Lumbar facet arthropathy  Other spondylosis, lumbar region  Myofascial pain    Has upcoming bilateral LE EMG      PLAN:   Pt follows with Dr Aguirre and receives ESIs, RFAs and does quite well with these injections. Dr Aguirre also does all of his extensive medication management  Pt to continue with Dr Aguirre as injection would be the same here as he is currently receiving from him and has for years  Follow up prn      VITALS:  Visit Vitals  Ht 5' 11\" (1.803 m)   Wt 121.6 kg (268 lb)   BMI 37.38 kg/m²   Respirations 16    ALLERGIES:  ALLERGIES:  No Known Allergies    MEDICATIONS:  No outpatient medications have been marked as taking for the 3/17/25 encounter (Office Visit) with Cata Leong APNP.       PAST MEDICAL HISTORY:  Past Medical History:   Diagnosis Date    Advance directive discussed with patient 11/2013    POA at home    Anxiety     Chronic obstructive lung disease  (CMD)     Chronic pain     chronic back pain    Chronic rhinosinusitis     Constipation due to pain medication     COPD (chronic obstructive pulmonary disease)  (CMD)     DDD (degenerative disc disease)     Depression     Emphysema     dx in past when smoking    Esophagitis 10/2013    Farsightedness     reading glasses    Fracture 1998    finger    Gastro-oesophageal reflux disease     severe symptoms, did not tolerate 24 hour PH study, scheduled for BRAVO  7/29/14    Gastroesophageal reflux disease without esophagitis 03/21/2022    Gliosis 2013    Head ache     Hepatitis C     treated 2012, \"I've been cured of it\"    Hypothyroidism     Inflammatory bowel disease     colitis in the past     Insomnia     Kidney stones     Lumbar spondylosis     Obstructive  Left message for patient to call back.   Please see provider note below.    Sent detailed [employer] message.  Patient has viewed results on [employer].   sleep apnea on CPAP     CPAP    Pilonidal cyst     Prostate cancer  (CMD)     Right knee meniscal tear     Substance abuse  (CMD)     Tinnitus 2013    Trigeminal neuralgia     Unspecified sinusitis (chronic)     Wears prescription eyeglasses        PAST SURGICAL HISTORY:  Past Surgical History:   Procedure Laterality Date    Arthroscopic suprascapular nerve ablation Left     cervical  neck ablation - Dr Trinidad     Biopsy prostate  01/19/2024    BIOPSY, PROSTATE, PERINEAL APPROACH    Cyst removal Right 08/02/2018    Dr Oro - Knee excision medial cyst         Cystoscopy,ureteroscopy,stone remv Right 05/19/2016    with insertion of ureteral stent- Dr Mark Quezada    Esophagogastroduodenoscopy  03/01/2016    Dr Degroot- nonerosive gastritis     Esophagogastroduodenoscopy (egd)  03/13/2019    Esophagogastroduodenoscopy transoral flex us guide needle bx  02/07/2020    Esophagogastroduodenoscopy transoral flex w/bx single or mult  03/09/2022    Knee scope,med or lat menis repair Right 11/2017    Pilonidal cyst drainage  2010    and 1980's    Removal gallbladder      Remv prostate,retropub,radical  04/19/2024    Remv prostate,retropub,radical  2024    Service to gastroenterology  10/16/2013,7/29/2014    EGD -multiple times    Service to gastroenterology  05/10/2006 and 3/6/2013    colonoscopy     Sinus surgery  06/15/2012    revision of left frontal sinuplasty, sinusotomy and bilateral maxilllary antrostomy     Tonsillectomy and adenoidectomy         FAMILY HISTORY:  Family History   Problem Relation Age of Onset    Heart disease Mother     Macular degeneration Mother     Hypertension Mother     Diabetes Mother     Cancer Mother         lung cancer - passed away 4/24    Parkinsonism Father     Hypertension Brother     NEGATIVE FAMILY HX OF Brother     Stroke Other        SOCIAL HISTORY:  Social History     Tobacco Use    Smoking status: Former     Current packs/day: 0.00     Average packs/day: 1 pack/day for 18.0  years (18.0 ttl pk-yrs)     Types: Cigarettes     Start date: 1980     Quit date: 1998     Years since quittin.1    Smokeless tobacco: Former     Types: Chew   Vaping Use    Vaping status: never used   Substance Use Topics    Alcohol use: No     Alcohol/week: 0.0 standard drinks of alcohol     Comment: was a heavy drinker - last drink was dx w/ hep c and quit    Drug use: No       Social History     Tobacco Use   Smoking Status Former    Current packs/day: 0.00    Average packs/day: 1 pack/day for 18.0 years (18.0 ttl pk-yrs)    Types: Cigarettes    Start date: 1980    Quit date: 1998    Years since quittin.1   Smokeless Tobacco Former    Types: Chew       Social History     Substance and Sexual Activity   Alcohol Use No    Alcohol/week: 0.0 standard drinks of alcohol    Comment: was a heavy drinker - last drink was -dx w/ hep c and quit         HISTORY OF PRESENT ILLNESS:  Meliton is a 61 year old male who was referred for evaluation of lower back pain.  Nontraumatic onset many years ago.  Patient has  been following with Sr Aguirre for years and is on an extensive medication regimen and also receives ESIs, TPIs and RFAs with good relief. He tells me he gets good relief with these injections and they are usually repeated every 4-6 months depending on the injections.  He sees neurology. He has history of prostrate cancer.The pain limits his ability to perform activities of daily living such as sitting/standing/walking/bending.     Low back pain:  5-10/10, dull/achy and sharp/stabbing pain located in low back, both side.  Pain does radiate down his bilateral lower extremities.  The pain is associated with numbness and tingling in the bilateral lower extremities.  Pain is not associated with weakness in the lower extremities. Pain is not associated with cramps in the lower extremities.        REVIEW OF SYSTEMS:  All systems are negative except as noted in the history of present  illness.      PHYSICAL EXAMINATION:  Constitutional  General Appearance - No discomfort at rest.  Cardiovascular (peripheral vascular system)  Observation:  Lower limb varicosities (Clinical Severity Grading):  No evidence of venous disease.  Palpation:  Lower-limb edema:  Normal; Temperature:  Normal.  Respiratory  Cyanosis:  Lower limbs:  None.  Lymphatic  Skin:  No rashes or lesions.   Musculoskeletal and Neurological/Psychiatric  Orientation to time, place, person:  Normal - alert and oriented x3.  Mood and Affect:  Normal.      Lumbar Spine Exam:  Lumbar Inspection:  Standing position  Alignment:   midline   Lumbar Lordosis:  normal   Scoliosis:  Absent  Overlying Skin:  normal  Gait/Station:  Antalgic    Active ROM:     Left Right   Flexion 1/2 - 3/4 with pain   Extension 1/2 -3/4 with pain   Oblique Extension positive positive       Palpation:   (Unless labeled as inconsistent, findings were reproducible with repeat palpation)    Region Palpated Lumbar PSP Sacroiliac/PSIS Sup. cluneal Piriformis region G. Trochanter/ITB Flank   Left         Tight Yes N/A N/A Neg Neg Neg   Tender Yes Neg Neg Neg Neg Neg   Trigger Points Neg Neg Neg Neg Neg Neg   Right         Tight Yes N/A N/A Neg Neg Neg   Tender Yes Neg Neg Neg Neg Neg   Trigger Points Neg Neg Neg Neg Neg Neg       Strength        Rt Strength Right Left   L2 Hip Flexion 5/5 5/5   L3 Knee Ext 5/5 5/5   L4 Ank D Flexion 5/5 5/5   L5 Big Toe Ext 5/5 5/5   S1 Ank P Flexion 5/5 5/5       Light touch Sensory Exam:   Rt Sensory Right Left   L2 Anterior thigh normal normal   L3 Medial knee normal normal   L4 Medial malleolus normal normal   L5 Dorsum of foot normal normal   S1 Lateral heel normal normal    Stocking Pattern normal normal       Pinprick Sensory Exam:   Rt Sensory Right Left   L2 Anterior thigh normal normal   L3 Medial knee normal normal   L4 Medial malleolus normal normal   L5 Dorsum of foot normal normal   S1 Lateral heel normal normal    Stocking  Pattern normal normal       Long tract signs (UMN signs), e.g., for myelopathy:    Right Left   Ankle clonus Negative Negative   Babinski sign normal normal       DATA REVIEWED:  X ray of lumbar spine 3/11/25  MRI of thoracic and cervical spine 2/28/25  MRI of lumbar spine 12/12/24               PLAN REFERRED CLINIC: A copy of the consultation note has been sent to the requesting provider - DEXTER Kaye, and primary care provider - Michael Small MD. Thank you for allowing me to take part in the care of Mr. Mcdonough. If you have any questions, please feel free to contact me.

## 2025-04-02 ENCOUNTER — TELEPHONE (OUTPATIENT)
Dept: INTERNAL MEDICINE CLINIC | Facility: CLINIC | Age: 25
End: 2025-04-02

## 2025-04-02 ENCOUNTER — TELEMEDICINE (OUTPATIENT)
Dept: FAMILY MEDICINE CLINIC | Facility: CLINIC | Age: 25
End: 2025-04-02

## 2025-04-02 DIAGNOSIS — L73.9 FOLLICULITIS: Primary | ICD-10-CM

## 2025-04-02 PROCEDURE — 98004 SYNCH AUDIO-VIDEO EST SF 10: CPT | Performed by: NURSE PRACTITIONER

## 2025-04-02 NOTE — TELEPHONE ENCOUNTER
Patient calling, she is again having beginning symptoms from folliculitis and has visit scheduled with surgery this month for eval and opinion on this. Area is slightly red, more of a \"discoloration\" to area. Can feel tender when she leans back or pressure to the area.  No fever, drainage.   Has see PCP for this and given ABT's and thinks she may need this now to get her to visit date with surgery to help with flare.    Unable to come in, has class and cannot miss.   Offered video today, she is agreeable. Since seen for this before and assess agreeable and feel would be ok.     Future Appointments   Date Time Provider Department Center   4/2/2025  4:15 PM Lisa Anderson APRN [facility]  [facility]   4/21/2025  9:15 AM Checo Herrera MD [id number]

## 2025-04-02 NOTE — PROGRESS NOTES
HPI    Virtual Telephone/Video Check-In    Mary Wei verbally consents to a Virtual/Telephone Check-In visit on 04/02/25.  Patient has been referred to the [facility] website at [email or web address] to review the yearly Consent to Treat document.    Patient understands and accepts financial responsibility for any deductible, co-insurance and/or co-pays associated with this service.    Duration of the service: 10 minutes      Summary of topics discussed:     Patient presents for concerns of recurrent folliculitis-vs-pilonidal cyst.  Has an appt with a surgeon on 4/21 for assessment.  Last round of antibiotics was on 2/24/25.  Currently with inflammation to tailbone and discomfort when sitting.  Denies puss, fever.  Symptoms presented again yesterday.      Review of Systems   Musculoskeletal:         Redness/swelling/tenderness at tailbone.   All other systems reviewed and are negative.       There were no vitals filed for this visit.  There is no height or weight on file to calculate BMI.    Health Maintenance   Topic Date Due    COVID-19 Vaccine (4 - 2024-25 season) 09/01/2024    Annual Depression Screening  01/01/2025    Influenza Vaccine (Season Ended) 10/01/2025    Annual Physical  12/23/2025    Pap Smear  08/09/2027    DTaP,Tdap,and Td Vaccines (8 - Td or Tdap) 09/29/2033    HPV Vaccines  Completed    Meningococcal B Vaccine  Completed    Pneumococcal Vaccine: Birth to 50yrs  Aged Out       History reviewed. No pertinent past medical history.    .  Past Surgical History:   Procedure Laterality Date    Other surgical history  09/11/2024    I & D OF PILONIDAL SINUS       Family History   Problem Relation Age of Onset    Other (Other) Father         Myasthenia Gravis    Other (Other) Other         No Sudden Cardiac Death of Close Relative       Social History     Socioeconomic History    Marital status: Single     Spouse name: Not on file    Number of children: Not on file    Years of education: Not on file     Highest education level: Not on file   Occupational History    Not on file   Tobacco Use    Smoking status: Never     Passive exposure: Never    Smokeless tobacco: Never    Tobacco comments:     No  Households Smokers    Vaping Use    Vaping status: Never Used   Substance and Sexual Activity    Alcohol use: No    Drug use: No    Sexual activity: Not on file   Other Topics Concern     Service Not Asked    Blood Transfusions Not Asked    Caffeine Concern No    Occupational Exposure Not Asked    Hobby Hazards Not Asked    Sleep Concern Not Asked    Stress Concern Not Asked    Weight Concern Not Asked    Special Diet Not Asked    Back Care Not Asked    Exercise Not Asked    Bike Helmet Not Asked    Seat Belt Not Asked    Self-Exams Not Asked    Grew up on a farm Not Asked    History of tanning Not Asked    Outdoor occupation Not Asked    Pt has a pacemaker No    Pt has a defibrillator No    Breast feeding Not Asked    Reaction to local anesthetic No   Social History Narrative    Not on file     Social Drivers of Health     Food Insecurity: No Food Insecurity (6/6/2024)    Received from [facility]    Hunger Vital Sign     Worried About Running Out of Food in the Last Year: Never true     Ran Out of Food in the Last Year: Never true   Transportation Needs: No Transportation Needs (6/6/2024)    Received from [facility]    PRAPARE - Transportation     Lack of Transportation (Medical): No     Lack of Transportation (Non-Medical): No   Stress: No Stress Concern Present (6/6/2024)    Received from [facility]    [employer] [place] [employer] - Occupational Stress Questionnaire     Feeling of Stress : Only a little   Housing Stability: Unknown (6/6/2024)    Received from [facility]    Housing Stability Vital Sign     Unable to Pay for Housing in the Last Year: No     Number of Times Moved in the Last Year: Not on file     Homeless in  the Last Year: No       Current Outpatient Medications   Medication Sig Dispense Refill    amoxicillin clavulanate 875-125 MG Oral Tab Take 1 tablet by mouth 2 (two) times daily for 10 days. 20 tablet 0    Mometasone Furoate 0.1 % External Cream Apply 1 Application topically 2 (two) times daily as needed. 45 g 0    Bacitracin-Polymyxin B 500-41078 UNIT/GM External Ointment Apply 1 Application topically 2 (two) times daily. 15 g 0    Fish Oil-Cholecalciferol (FISH OIL + D3 OR) Take by mouth.      Multiple Vitamins-Minerals (MULTI-VITAMIN/MINERALS) Oral Tab Take 1 tablet by mouth daily.      spironolactone 25 MG Oral Tab Take 1 tablet (25 mg total) by mouth daily.      Levocetirizine Dihydrochloride 5 MG Oral Tab Take 1 tablet (5 mg total) by mouth every evening. 90 tablet 2       Allergies:  Allergies[1]    Physical Exam  Constitutional:       Appearance: Normal appearance.   Pulmonary:      Effort: No respiratory distress.   Neurological:      Mental Status: She is alert and oriented to person, place, and time.          Assessment and Plan:   Problem List Items Addressed This Visit    None  Visit Diagnoses       Folliculitis    -  Primary    Relevant Medications    amoxicillin clavulanate 875-125 MG Oral Tab           Augmentin twice daily x 10 days.  Supportive care discussed.  Follow-up with surgeon on 4/21 as scheduled.    Discussed plan of care with patient and patient is in agreement.  All questions answered. Patient to call with questions or concerns.    Encouraged to sign up for My Chart if not already registered.          [1] No Known Allergies

## 2025-04-21 ENCOUNTER — OFFICE VISIT (OUTPATIENT)
Facility: LOCATION | Age: 25
End: 2025-04-21
Payer: COMMERCIAL

## 2025-04-21 VITALS
DIASTOLIC BLOOD PRESSURE: 74 MMHG | SYSTOLIC BLOOD PRESSURE: 124 MMHG | OXYGEN SATURATION: 99 % | TEMPERATURE: 98 F | HEART RATE: 88 BPM

## 2025-04-21 DIAGNOSIS — K61.0 PERIANAL ABSCESS: Primary | ICD-10-CM

## 2025-04-21 NOTE — PROGRESS NOTES
The following individual(s) verbally consented to be recorded using ambient AI listening technology and understand that they can each withdraw their consent to this listening technology at any point by asking the clinician to turn off or pause the recording:    Patient name: Mary Wei

## 2025-04-22 NOTE — H&P
New Patient Visit Note    The following individual(s) verbally consented to be recorded using ambient AI listening technology and understand that they can each withdraw their consent to this listening technology at any point by asking the clinician to turn off or pause the recording:    Patient name: Mary Wei      Active Problems      1. Perianal abscess        Chief Complaint   Chief Complaint   Patient presents with    New Patient     NP- folliculitis. ref by Dr. Grace. Pt has had issues for about a year. Pt reports two different areas that are currently painful and draining. Pt has had I&D in the past.        History of Present Illness   History of Present Illness  Mary Wei is a very nice 25-year-old female who presents with recurrent infections in the tailbone and perianal area.    She has experienced recurrent infections in the tailbone and perianal area for over a year, beginning around March 1st of 2024. The first episode required incision and drainage (I&D), followed by a recurrence in July treated with antibiotics. Another recurrence occurred in late September or early October, again necessitating I&D. In November, she had another infection above the perianal area, which also required I&D.  Currently, she is having some ongoing drainage from the spot near her tailbone.  She is also having some discomfort and swelling from her area of prior perianal infection.    She experiences discomfort and pain when sitting, particularly when the infection is near the tailbone, and pressure when the infection is perianal. She typically seeks antibiotics when she starts to feel discomfort to prevent progression. She has undergone three I&D procedures and multiple courses of antibiotics over the past year. No imaging studies have been performed to date.    No bowel issues such as diarrhea, rectal bleeding, or abdominal pain. There is no family history of Crohn's disease, and she has never had a  colonoscopy. She works as at [employer] and is currently in a physical therapy program.         Katy Hernandez has no known allergies.    Past Medical / Surgical / Social / Family History    The past medical and past surgical history have been reviewed by me today.    Past Medical History[1]  Past Surgical History[2]    The family history and social history have been reviewed by me today.    Family History[3]  Social Hx on file[4]   Medications - Current[5]      Review of Systems  A 10 point review of systems was performed and negative unless otherwise documented per HPI.    Physical Findings   /74 (BP Location: Left arm, Patient Position: Sitting, Cuff Size: adult)   Pulse 88   Temp 98.2 °F (36.8 °C) (Temporal)   LMP 2025 (Approximate)   SpO2 99%   Physical Exam  Vitals and nursing note reviewed. Exam conducted with a chaperone present.   Constitutional:       General: She is not in acute distress.  HENT:      Head: Normocephalic and atraumatic.      Mouth/Throat:      Mouth: Mucous membranes are moist.   Cardiovascular:      Rate and Rhythm: Normal rate and regular rhythm.   Pulmonary:      Effort: Pulmonary effort is normal.   Abdominal:      General: There is no distension.      Palpations: Abdomen is soft.      Tenderness: There is no abdominal tenderness.   Genitourinary:     Comments: Patient examined in the prone jackknife position with female medical assistant chaperone present.  External exam of the anus reveals a small skin opening measuring less than 1 cm in size at the top of the  cleft just to the right of midline around 8 cm from the coccyx.  There is some minimal expressible seropurulent fluid drainage from this area.  No obvious, enlarged midline pits.  There is another small area of raised induration and tenderness in the posterior midline anoderm beneath the coccyx 4 cm from the anal verge.  No expressible drainage from this area.  Digital rectal exam shows good tone  without any obvious internal openings or drainage.  No masses, bleeding or tenderness.  Musculoskeletal:         General: No deformity.   Skin:     General: Skin is warm and dry.   Neurological:      General: No focal deficit present.      Mental Status: She is alert.   Psychiatric:         Mood and Affect: Mood normal.             Assessment and Plan   Assessment & Plan  Recurrent perianal and alisa cleft infections  Recurrent infections in the perianal and alisa cleft regions for over a year, with multiple incision and drainage procedures and antibiotic treatments. Current presentation includes a perianal area of induration with swelling and tenderness, and a small skin opening with drainage at the apex of the  cleft. Differential diagnosis includes anal fistula and pilonidal disease. No obvious internal opening on bedside rectal exam. Discussed the possibility of a fistula connecting to the rectum or pilonidal disease with trapped hairs causing chronic abscess formation. Surgical intervention is recommended to diagnose and treat the underlying issue. Risks of surgery include bleeding and infection, with a potential long recovery period for wound healing. Discussed the possibility of needing multiple surgeries if a fistula is present, with the first step being the placement of a seton. If pilonidal disease is confirmed, a pilonidal cystectomy may be performed, which involves unroofing the area and leaving it open to heal by secondary intention, potentially taking weeks to months. Recovery may require assistance with dressing changes and could impact her physical therapy program schedule depending on level of postoperative pain and wound management.    - I recommend planning for anal exam under anesthesia with anoscopy, possible pilonidal cystectomy, possible seton placement, possible fistulotomy.  The details of this procedure were discussed including the expected recovery time, risk,s benefits and  alternatives.    - Patient was somewhat emotional and tearful due to surgical planning and fear of missing time from her physical therapy program.  Ultimately, patient was agreeable to schedule surgery.  Surgery has been scheduled for 5/23/2025 at [facility].    - In the meantime, continue local wound care, over-the-counter pain medication and sitz bath's as needed.  Patient is welcome to contact me with any new or worsening symptoms.       Imaging & Referrals   None    Follow Up  No follow-ups on file.    Checo Herrera MD       [1]   Past Medical History:   Allergic rhinitis    Anxiety    Strep throat   [2]   Past Surgical History:  Procedure Laterality Date    Other surgical history  09/11/2024    I & D OF PILONIDAL SINUS   [3]   Family History  Problem Relation Age of Onset    Depression Mother     Other (Other) Father         Myasthenia Gravis    Asthma Father     Other (Other) Other         No Sudden Cardiac Death of Close Relative   [4]   Social History  Socioeconomic History    Marital status: Single   Tobacco Use    Smoking status: Never     Passive exposure: Never    Smokeless tobacco: Never    Tobacco comments:     No  Households Smokers    Vaping Use    Vaping status: Never Used   Substance and Sexual Activity    Alcohol use: Not Currently    Drug use: Never   Other Topics Concern    Caffeine Concern No    Stress Concern No    Weight Concern Yes    Special Diet No    Exercise Yes    Seat Belt No    Pt has a pacemaker No    Pt has a defibrillator No    Reaction to local anesthetic No   [5]   Current Outpatient Medications:     Mometasone Furoate 0.1 % External Cream, Apply 1 Application topically 2 (two) times daily as needed., Disp: 45 g, Rfl: 0    Bacitracin-Polymyxin B 500-39679 UNIT/GM External Ointment, Apply 1 Application topically 2 (two) times daily., Disp: 15 g, Rfl: 0    Fish Oil-Cholecalciferol (FISH OIL + D3 OR), Take by mouth., Disp: , Rfl:     Multiple Vitamins-Minerals  (MULTI-VITAMIN/MINERALS) Oral Tab, Take 1 tablet by mouth in the morning., Disp: , Rfl:     spironolactone 25 MG Oral Tab, Take 1 tablet (25 mg total) by mouth in the morning., Disp: , Rfl:     Levocetirizine Dihydrochloride 5 MG Oral Tab, Take 1 tablet (5 mg total) by mouth every evening., Disp: 90 tablet, Rfl: 2

## 2025-04-29 NOTE — TELEPHONE ENCOUNTER
LMTCB
LMTCB
LOV 1/25/2017 for dermatitis. Last Rx was sent on 11/2/2017 with 0 refills. Please advise on  Refill request, thank you!
Message left on mother's answering machine that medication requested was filled at requested pharmacy. Informed one time refill only, that pt will need to be seen for an appt for any further refills. and asked to call the office to schedule a f/u appt.
Please contact pt-ok to please send rf x1 , but due for f/u also. Is she taking this continuously of just prn (for perioral dermatitis)?
rf x 1 only no call back from pt .   Due for f/u appt
1

## 2025-05-08 ENCOUNTER — TELEPHONE (OUTPATIENT)
Facility: LOCATION | Age: 25
End: 2025-05-08

## 2025-05-08 NOTE — TELEPHONE ENCOUNTER
Patient  Member ID  [id number]    Date of Birth  2000-03-01    Gender  NA    Transaction Type  Outpatient Authorization    Organization  [facility]    Payer  [employer] (COMMERCIAL & MEDICARE)    [employer] logo  Transaction ID: Not FoundCustomer ID: [id number] Date: NA  No Authorization Required  Place of Service  22 - On [facility]    Service From - To Date  NA    Admission Type  9    Diagnosis Code 1  K610 - Anal abscess    Procedure Code 1  48589    Quantity  1 Units    Procedure From - To Date  2025-05-23    Status  NO AUTH REQUIRED    Message  PRECERT IS NOT REQUIRED OR ONLY REQUIRED IN UNIQUE CIRCUMSTANCES FOR MEDICAID REFER TO PRIOR AUTH TOOL ON [employer] FOR ALL OTHERS REFER TO CODE SEARCH TOOL ON [employer] COVERAGE OF SERVICES ARE SUBJECT TO BENEFITS AND ELIGIBILITY    Procedure Code 2  47966    Quantity  1 Units    Procedure From - To Date  2025-05-23    Status  NO AUTH REQUIRED    Message  PRECERT IS NOT REQUIRED OR ONLY REQUIRED IN UNIQUE CIRCUMSTANCES FOR MEDICAID REFER TO PRIOR AUTH TOOL ON [employer] FOR ALL OTHERS REFER TO CODE SEARCH TOOL ON [employer] COVERAGE OF SERVICES ARE SUBJECT TO BENEFITS AND ELIGIBILITY    Procedure Code 3  91532    Quantity  1 Units    Procedure From - To Date  2025-05-23    Status  NO AUTH REQUIRED    Message  PRECERT IS NOT REQUIRED OR ONLY REQUIRED IN UNIQUE CIRCUMSTANCES FOR MEDICAID REFER TO PRIOR AUTH TOOL ON [employer] FOR ALL OTHERS REFER TO CODE SEARCH TOOL ON [employer] COVERAGE OF SERVICES ARE SUBJECT TO BENEFITS AND ELIGIBILITY

## 2025-05-12 ENCOUNTER — TELEPHONE (OUTPATIENT)
Facility: LOCATION | Age: 25
End: 2025-05-12

## 2025-05-14 ENCOUNTER — APPOINTMENT (OUTPATIENT)
Dept: ADMINISTRATIVE | Facility: HOSPITAL | Age: 25
End: 2025-05-14
Payer: COMMERCIAL

## 2025-05-14 RX ORDER — HEPARIN SODIUM 5000 [USP'U]/ML
5000 INJECTION, SOLUTION INTRAVENOUS; SUBCUTANEOUS ONCE
Status: CANCELLED | OUTPATIENT
Start: 2025-05-14 | End: 2025-05-14

## 2025-05-14 RX ORDER — DOXYCYCLINE HYCLATE 20 MG
20 TABLET ORAL DAILY
COMMUNITY
Start: 2025-04-11

## 2025-05-19 ENCOUNTER — TELEPHONE (OUTPATIENT)
Facility: LOCATION | Age: 25
End: 2025-05-19

## 2025-05-23 ENCOUNTER — HOSPITAL ENCOUNTER (OUTPATIENT)
Facility: HOSPITAL | Age: 25
Setting detail: HOSPITAL OUTPATIENT SURGERY
Discharge: HOME | End: 2025-05-23
Attending: STUDENT IN AN ORGANIZED HEALTH CARE EDUCATION/TRAINING PROGRAM | Admitting: STUDENT IN AN ORGANIZED HEALTH CARE EDUCATION/TRAINING PROGRAM
Payer: COMMERCIAL

## 2025-05-23 ENCOUNTER — ANESTHESIA EVENT (OUTPATIENT)
Dept: SURGERY | Facility: HOSPITAL | Age: 25
End: 2025-05-23
Payer: COMMERCIAL

## 2025-05-23 ENCOUNTER — ANESTHESIA (OUTPATIENT)
Dept: SURGERY | Facility: HOSPITAL | Age: 25
End: 2025-05-23
Payer: COMMERCIAL

## 2025-05-23 VITALS
SYSTOLIC BLOOD PRESSURE: 107 MMHG | HEIGHT: 63 IN | BODY MASS INDEX: 30.83 KG/M2 | TEMPERATURE: 97 F | WEIGHT: 174 LBS | RESPIRATION RATE: 18 BRPM | OXYGEN SATURATION: 100 % | HEART RATE: 89 BPM | DIASTOLIC BLOOD PRESSURE: 62 MMHG

## 2025-05-23 DIAGNOSIS — K61.0 PERIANAL ABSCESS: ICD-10-CM

## 2025-05-23 DIAGNOSIS — L05.91 PILONIDAL CYST: Primary | ICD-10-CM

## 2025-05-23 PROBLEM — L98.8 PILONIDAL DISEASE: Status: ACTIVE | Noted: 2025-05-23

## 2025-05-23 LAB — B-HCG UR QL: NEGATIVE

## 2025-05-23 PROCEDURE — 11772 EXC PILONIDAL CYST COMP: CPT | Performed by: STUDENT IN AN ORGANIZED HEALTH CARE EDUCATION/TRAINING PROGRAM

## 2025-05-23 RX ORDER — HYDROMORPHONE HYDROCHLORIDE 1 MG/ML
0.2 INJECTION, SOLUTION INTRAMUSCULAR; INTRAVENOUS; SUBCUTANEOUS EVERY 5 MIN PRN
Status: DISCONTINUED | OUTPATIENT
Start: 2025-05-23 | End: 2025-05-23

## 2025-05-23 RX ORDER — ONDANSETRON 2 MG/ML
4 INJECTION INTRAMUSCULAR; INTRAVENOUS EVERY 6 HOURS PRN
Status: DISCONTINUED | OUTPATIENT
Start: 2025-05-23 | End: 2025-05-23

## 2025-05-23 RX ORDER — BUPIVACAINE HYDROCHLORIDE AND EPINEPHRINE 2.5; 5 MG/ML; UG/ML
INJECTION, SOLUTION EPIDURAL; INFILTRATION; INTRACAUDAL; PERINEURAL AS NEEDED
Status: DISCONTINUED | OUTPATIENT
Start: 2025-05-23 | End: 2025-05-23 | Stop reason: HOSPADM

## 2025-05-23 RX ORDER — ACETAMINOPHEN 500 MG
1000 TABLET ORAL ONCE AS NEEDED
Status: DISCONTINUED | OUTPATIENT
Start: 2025-05-23 | End: 2025-05-23

## 2025-05-23 RX ORDER — PROCHLORPERAZINE EDISYLATE 5 MG/ML
5 INJECTION INTRAMUSCULAR; INTRAVENOUS EVERY 8 HOURS PRN
Status: DISCONTINUED | OUTPATIENT
Start: 2025-05-23 | End: 2025-05-23

## 2025-05-23 RX ORDER — LIDOCAINE HYDROCHLORIDE 10 MG/ML
INJECTION, SOLUTION EPIDURAL; INFILTRATION; INTRACAUDAL; PERINEURAL AS NEEDED
Status: DISCONTINUED | OUTPATIENT
Start: 2025-05-23 | End: 2025-05-23 | Stop reason: SURG

## 2025-05-23 RX ORDER — HYDROMORPHONE HYDROCHLORIDE 1 MG/ML
0.6 INJECTION, SOLUTION INTRAMUSCULAR; INTRAVENOUS; SUBCUTANEOUS EVERY 5 MIN PRN
Status: DISCONTINUED | OUTPATIENT
Start: 2025-05-23 | End: 2025-05-23

## 2025-05-23 RX ORDER — ONDANSETRON 2 MG/ML
INJECTION INTRAMUSCULAR; INTRAVENOUS AS NEEDED
Status: DISCONTINUED | OUTPATIENT
Start: 2025-05-23 | End: 2025-05-23 | Stop reason: SURG

## 2025-05-23 RX ORDER — SODIUM CHLORIDE, SODIUM LACTATE, POTASSIUM CHLORIDE, CALCIUM CHLORIDE 600; 310; 30; 20 MG/100ML; MG/100ML; MG/100ML; MG/100ML
INJECTION, SOLUTION INTRAVENOUS CONTINUOUS
Status: DISCONTINUED | OUTPATIENT
Start: 2025-05-23 | End: 2025-05-23

## 2025-05-23 RX ORDER — HYDROMORPHONE HYDROCHLORIDE 1 MG/ML
0.4 INJECTION, SOLUTION INTRAMUSCULAR; INTRAVENOUS; SUBCUTANEOUS EVERY 5 MIN PRN
Status: DISCONTINUED | OUTPATIENT
Start: 2025-05-23 | End: 2025-05-23

## 2025-05-23 RX ORDER — HYDROCODONE BITARTRATE AND ACETAMINOPHEN 5; 325 MG/1; MG/1
2 TABLET ORAL ONCE AS NEEDED
Status: DISCONTINUED | OUTPATIENT
Start: 2025-05-23 | End: 2025-05-23

## 2025-05-23 RX ORDER — HYDROCODONE BITARTRATE AND ACETAMINOPHEN 5; 325 MG/1; MG/1
1 TABLET ORAL ONCE AS NEEDED
Status: DISCONTINUED | OUTPATIENT
Start: 2025-05-23 | End: 2025-05-23

## 2025-05-23 RX ORDER — MIDAZOLAM HYDROCHLORIDE 1 MG/ML
1 INJECTION INTRAMUSCULAR; INTRAVENOUS EVERY 5 MIN PRN
Status: DISCONTINUED | OUTPATIENT
Start: 2025-05-23 | End: 2025-05-23

## 2025-05-23 RX ORDER — ACETAMINOPHEN 500 MG
1000 TABLET ORAL ONCE
Status: DISCONTINUED | OUTPATIENT
Start: 2025-05-23 | End: 2025-05-23 | Stop reason: HOSPADM

## 2025-05-23 RX ORDER — SCOPOLAMINE 1 MG/3D
1 PATCH, EXTENDED RELEASE TRANSDERMAL ONCE
Status: DISCONTINUED | OUTPATIENT
Start: 2025-05-23 | End: 2025-05-23 | Stop reason: HOSPADM

## 2025-05-23 RX ORDER — OXYCODONE HYDROCHLORIDE 5 MG/1
5 TABLET ORAL EVERY 6 HOURS PRN
Qty: 20 TABLET | Refills: 0 | Status: SHIPPED | OUTPATIENT
Start: 2025-05-23 | End: 2025-06-02 | Stop reason: ALTCHOICE

## 2025-05-23 RX ORDER — NALOXONE HYDROCHLORIDE 0.4 MG/ML
0.08 INJECTION, SOLUTION INTRAMUSCULAR; INTRAVENOUS; SUBCUTANEOUS AS NEEDED
Status: DISCONTINUED | OUTPATIENT
Start: 2025-05-23 | End: 2025-05-23

## 2025-05-23 RX ORDER — METRONIDAZOLE 500 MG/100ML
500 INJECTION, SOLUTION INTRAVENOUS ONCE
Status: COMPLETED | OUTPATIENT
Start: 2025-05-23 | End: 2025-05-23

## 2025-05-23 RX ADMIN — LIDOCAINE HYDROCHLORIDE 80 MG: 10 INJECTION, SOLUTION EPIDURAL; INFILTRATION; INTRACAUDAL; PERINEURAL at 08:54:00

## 2025-05-23 RX ADMIN — METRONIDAZOLE 500 MG: 500 INJECTION, SOLUTION INTRAVENOUS at 08:56:00

## 2025-05-23 RX ADMIN — ONDANSETRON 4 MG: 2 INJECTION INTRAMUSCULAR; INTRAVENOUS at 09:35:00

## 2025-05-23 NOTE — ANESTHESIA POSTPROCEDURE EVALUATION
[facility] Patient Status:  Hospital Outpatient Surgery   Age/Gender 25 year old female MRN [id number]   Location [facility] POST ANESTHESIA CARE UNIT Attending Checo Herrera MD   Hosp Day # 0 PCP Skinny Grace DO       Anesthesia Post-op Note    .    Procedure Summary       Date: 05/23/25 Room / Location:  MAIN OR [address] [place] [address] MAIN OR    Anesthesia Start: 0851 Anesthesia Stop: 0953    Procedures:       ANAL EXAMINATION UNDER ANESTHESIA WITH ANOSCOPY, PILONIDAL CYSTECTOMY      . Diagnosis:       Perianal abscess      (Perianal abscess [K61.0])    Surgeons: Checo Herrera MD Anesthesiologist: Ellen Dash MD    Anesthesia Type: general ASA Status: 1            Anesthesia Type: general    Vitals Value Taken Time   /63 05/23/25 10:07   Temp 97.1 °F (36.2 °C) 05/23/25 09:53   Pulse 100 05/23/25 10:10   Resp 16 05/23/25 10:10   SpO2 100 % 05/23/25 10:10   Vitals shown include unfiled device data.        Patient Location: PACU    Anesthesia Type: general    Airway Patency: patent and extubated    Postop Pain Control: adequate    Mental Status: mildly sedated but able to meaningfully participate in the post-anesthesia evaluation    Nausea/Vomiting: none    Cardiopulmonary/Hydration status: stable euvolemic    Complications: no apparent anesthesia related complications    Postop vital signs: stable    Dental Exam: Unchanged from Preop    Patient to be discharged from PACU when criteria met.

## 2025-05-23 NOTE — OPERATIVE REPORT
[facility]  Operative Note    Mary Wei Location: OR   CSN [id number] MRN [id number]    3/1/2000 Age 25 year old   Admission Date 2025 Operation Date 2025   Attending Physician Checo Herrera MD Operating Physician Checo Herrera MD   PCP Skinny Grace DO          Patient Name: Mary Wei    Preoperative Diagnosis: Perianal abscess [K61.0]    Postoperative Diagnosis: Pilonidal disease    Primary Surgeon: Checo Herrera MD    Assistant: None    Anesthesia: General    Procedures: Anal Examiner anesthesia with anoscopy, pilonidal cystectomy    Implants: None    Specimen: Pilonidal cyst    Drains: None    Estimated Blood Loss: 5 cc    Complications: None immediate    Condition: Stable    Indications for Surgery:   Mary Wei is a very nice 25-year-old female who presents with recurrent infections in the tailbone and perianal area.  Her symptoms and exam findings were suspicious for either pilonidal disease versus perianal fistula.    I recommend planning for anal exam under anesthesia with anoscopy, possible pilonidal cystectomy, possible seton placement, possible fistulotomy. The details of this procedure were discussed including the expected recovery time, risks, benefits and alternatives.  Patient expressed understanding and was agreeable to schedule surgery with me.    Patient presents for surgery today.  Consent was signed.  All questions answered.    Surgical Findings:   Two small midline pits in the  cleft with a secondary opening about 3 cm cephalad to these pits just to the right of midline consistent with pilonidal disease.  Perianal skin appeared soft and healthy without any skin openings or drainage.  Digital rectal exam and anoscopy also normal aside from small internal hemorrhoids.    Description of Procedure:   Patient was brought to the operating room on the transport cart. Bilateral sequential compression devices were placed. Preoperative  antibiotics were given.  Patient was induced under general endotracheal anesthesia.  Patient was then carefully flipped prone onto the OR table with all pressure points well-padded.  Patient was positioned in prone jackknife with the buttocks retracted apart with silk tape.  The anus and perianal skin were prepped and draped in the usual sterile fashion.  A timeout was performed.    I began with external exam of the anus and  cleft, digital rectal exam and anoscopy using a Frederick bivalve anoscope.  I encountered the findings as described above.  I used a malleable fistula probe to cannulate the secondary opening.  This led to a subcutaneous track toward the midline pits in the alisa cleft.  These findings were consistent with pilonidal disease and there was no evidence of an anal fistula.  Therefore, I decided to proceed with a pilonidal cystectomy.    I marked out an elliptical incision encompassing the 2 small midline pits in the  cleft.  The skin was incised over my marked down to the subcutaneous fat.  The skin and underlying pilonidal cyst was excised using electrocautery.  The specimen was passed off the field and sent to pathology.  I then performed a fistulotomy over the fistula probe from the secondary opening down to the midline excision site using electrocautery.  There appeared to be an underlying small chronic abscess cavity filled with granulation tissue.  The granulation tissue was curetted using a lap pad.  Hemostasis was achieved within the cavity and skin edges using electrocautery.  The area was irrigated with warm saline solution and appeared hemostatic.  30 cc of 0.5% Marcaine with epinephrine was injected into the skin around the excision site for local anesthesia.  The skin was cleaned and dried and a wet-to-dry dressing was applied using saline moistened 4 x 4 gauze, dry 4 x 4 gauze, ABD pad and paper tape.    Patient was carefully flipped back supine onto the transport cart,  awakened from anesthesia, extubated and transferred to the postanesthesia care unit in stable condition.  All sponge, needle, and instrument counts were correct at the end of the case.  I was present for the entire case.        Checo Herrera MD  5/23/2025  10:07 AM

## 2025-05-23 NOTE — H&P
New Patient Visit Note    The following individual(s) verbally consented to be recorded using ambient AI listening technology and understand that they can each withdraw their consent to this listening technology at any point by asking the clinician to turn off or pause the recording:    Patient name: Mary Wei      Active Problems      No diagnosis found.      Chief Complaint   No chief complaint on file.      History of Present Illness   History of Present Illness  Mary Wei is a very nice 25-year-old female who presents with recurrent infections in the tailbone and perianal area.    She has experienced recurrent infections in the tailbone and perianal area for over a year, beginning around March 1st of 2024. The first episode required incision and drainage (I&D), followed by a recurrence in July treated with antibiotics. Another recurrence occurred in late September or early October, again necessitating I&D. In November, she had another infection above the perianal area, which also required I&D.  Currently, she is having some ongoing drainage from the spot near her tailbone.  She is also having some discomfort and swelling from her area of prior perianal infection.    She experiences discomfort and pain when sitting, particularly when the infection is near the tailbone, and pressure when the infection is perianal. She typically seeks antibiotics when she starts to feel discomfort to prevent progression. She has undergone three I&D procedures and multiple courses of antibiotics over the past year. No imaging studies have been performed to date.    No bowel issues such as diarrhea, rectal bleeding, or abdominal pain. There is no family history of Crohn's disease, and she has never had a colonoscopy. She works as at [employer] and is currently in a physical therapy program.         Allergies  Mary is allergic to dander.    Past Medical / Surgical / Social / Family History    The past  medical and past surgical history have been reviewed by me today.    Past Medical History[1]  Past Surgical History[2]    The family history and social history have been reviewed by me today.    Family History[3]  Social Hx on file[4]   Medications - Current[5]      Review of Systems  A 10 point review of systems was performed and negative unless otherwise documented per HPI.    Physical Findings   /73 (BP Location: Left arm)   Pulse 93   Temp 98.5 °F (36.9 °C) (Temporal)   Resp 17   Ht 63\"   Wt 174 lb (78.9 kg)   LMP 2025 (Within Days)   SpO2 100%   Breastfeeding No   BMI 30.82 kg/m²   Physical Exam  Vitals and nursing note reviewed. Exam conducted with a chaperone present.   Constitutional:       General: She is not in acute distress.  HENT:      Head: Normocephalic and atraumatic.      Mouth/Throat:      Mouth: Mucous membranes are moist.   Cardiovascular:      Rate and Rhythm: Normal rate and regular rhythm.   Pulmonary:      Effort: Pulmonary effort is normal.   Abdominal:      General: There is no distension.      Palpations: Abdomen is soft.      Tenderness: There is no abdominal tenderness.   Genitourinary:     Comments: Patient examined in the prone jackknife position with female medical assistant chaperone present.  External exam of the anus reveals a small skin opening measuring less than 1 cm in size at the top of the alisa cleft just to the right of midline around 8 cm from the coccyx.  There is some minimal expressible seropurulent fluid drainage from this area.  No obvious, enlarged midline pits.  There is another small area of raised induration and tenderness in the posterior midline anoderm beneath the coccyx 4 cm from the anal verge.  No expressible drainage from this area.  Digital rectal exam shows good tone without any obvious internal openings or drainage.  No masses, bleeding or tenderness.  Musculoskeletal:         General: No deformity.   Skin:     General: Skin is warm  and dry.   Neurological:      General: No focal deficit present.      Mental Status: She is alert.   Psychiatric:         Mood and Affect: Mood normal.             Assessment and Plan   Assessment & Plan  Recurrent perianal and alisa cleft infections  Recurrent infections in the perianal and  cleft regions for over a year, with multiple incision and drainage procedures and antibiotic treatments. Current presentation includes a perianal area of induration with swelling and tenderness, and a small skin opening with drainage at the apex of the alisa cleft. Differential diagnosis includes anal fistula and pilonidal disease. No obvious internal opening on bedside rectal exam. Discussed the possibility of a fistula connecting to the rectum or pilonidal disease with trapped hairs causing chronic abscess formation. Surgical intervention is recommended to diagnose and treat the underlying issue. Risks of surgery include bleeding and infection, with a potential long recovery period for wound healing. Discussed the possibility of needing multiple surgeries if a fistula is present, with the first step being the placement of a seton. If pilonidal disease is confirmed, a pilonidal cystectomy may be performed, which involves unroofing the area and leaving it open to heal by secondary intention, potentially taking weeks to months. Recovery may require assistance with dressing changes and could impact her physical therapy program schedule depending on level of postoperative pain and wound management.    - I recommend planning for anal exam under anesthesia with anoscopy, possible pilonidal cystectomy, possible seton placement, possible fistulotomy.  The details of this procedure were discussed including the expected recovery time, risk,s benefits and alternatives.    - Patient was somewhat emotional and tearful due to surgical planning and fear of missing time from her physical therapy program.  Ultimately, patient was  agreeable to schedule surgery.  Surgery has been scheduled for 5/23/2025 at [facility].    - In the meantime, continue local wound care, over-the-counter pain medication and sitz bath's as needed.  Patient is welcome to contact me with any new or worsening symptoms.       Imaging & Referrals   VITAL SIGNS  VITAL SIGNS - NOTIFY PHYSICIAN  NURSING COMMUNICATION  POCT PREGNANCY URINE  PLACE PIV  ACTIVITY AS TOLERATED  HEIGHT AND WEIGHT  NURSING COMMUNICATION  INITIATE ADULT PREOP PROPHYLACTIC ABX PROTOCOL  VERIFY INFORMED CONSENT  NPO    Follow Up  No follow-ups on file.    Checo Herrera MD       [1]   Past Medical History:   Allergic rhinitis    Anxiety    Attention deficit disorder    POSSIBLE, NOT OFFICIALLY DIAGNOSED, NO MEDICATION/TREATMENT    Strep throat    Visual impairment    GLASSES   [2]   Past Surgical History:  Procedure Laterality Date    Other surgical history  09/11/2024    I & D OF PILONIDAL SINUS   [3]   Family History  Problem Relation Age of Onset    Depression Mother     Other (Other) Father         Myasthenia Gravis    Asthma Father     Other (Other) Other         No Sudden Cardiac Death of Close Relative   [4]   Social History  Socioeconomic History    Marital status: Single   Tobacco Use    Smoking status: Never     Passive exposure: Never    Smokeless tobacco: Never    Tobacco comments:     No  Households Smokers    Vaping Use    Vaping status: Never Used   Substance and Sexual Activity    Alcohol use: Not Currently    Drug use: Never   Other Topics Concern    Caffeine Concern No    Stress Concern No    Weight Concern Yes    Special Diet No    Exercise Yes    Seat Belt No    Pt has a pacemaker No    Pt has a defibrillator No    Reaction to local anesthetic No   [5] No current outpatient medications on file.

## 2025-05-23 NOTE — DISCHARGE INSTRUCTIONS
Pilonidal Cystectomy  Post-Operative Instructions    Checo Herrera MD     MEDICATIONS  You will be given a prescription for pain.  Take 1-2 tablets every 4-6 hours as needed.  Pain medications will ease your pain, but you should expect some incisional pain for about 10 to 14 days.  You should walk often, cough and take deep breaths.      DIET  You may resume a general diet.  Do not eat excessively or drink alcohol.    ACTIVITY  After surgery, your driving reflexes will be slower, especially if you are taking pain medications.  Therefore, you are restricted from driving until after your follow-up visit and after you have stopped taking your prescription pain meds.  You may walk about the house, go shopping, see a movie, or eat at a restaurant.  You may also climb stairs and exercise as tolerated.  Remain off work until your follow-up visit or as instructed by your doctor.    WOUND CARE  You will need sterile saline solution, 4x4 gauze, and tape.  Ask your pharmacist where to find the supplies.  The first dressing change should be completed in the morning after surgery.  It is normal to see some blood when the dressing is removed.  Replace the dressing with a saline moistened gauze covered with several sheets of dry gauze.  Dressing changes should be performed three times a day and can be completed after showering or taking a bath.    APPOINTMENT  Please call our office for an appointment to see a physician assistant in about 2 weeks after surgery.  Call [phone]. If you are having trouble packing your wound, see us earlier for an instructional lesson.    Thank you for entrusting us with your care.      Pilonidal Cystectomy  Wound Care Instruction Sheet    Start with all the gauze out of the wound.  Take one or two of the gauze pads in your palm over the sink and pour a little saline onto them.  Squeeze firmly to get the excess water out of the gauze as well as to make them uniformly moist.    Place the wet  gauze in the bottom of the wound to fill it to skin level.  You do not have to “pack” the wound, but it should be tapped into the wound lightly.  On a large defect, it may take even 4-5 moist gauze pads.    Then place one or two dry gauze pads over the wound and wet dressing and tape them in place.  You will find the tape to be the most irritating part of the process after about three days.  My recommendation is to try to use only one or two pieces of tape to hold the dressing in place and to vary their position daily.  You may find it helpful to shave or use a cream hair removal to the tape area.    This process is best done with a partner doing the work.  Good dressing changes may mean the difference between success and recurrence.      Dressing changes initially will be two-three times a day.  The usual pattern is to take off all the dressings in the morning and hop in the shower.  Use soap and water to clean the outside of the wound.  Then change the dressing when you get home from work, and once more prior to going to bed.  If you are soaking through the top dressing, use less water and do the dressing changes more often.  If the gauze is sticking too much, use more saline on the bottom gauze.  When things settle down, do the dressing changes only twice a day.  The wound should be nearly closed in 4-6 weeks, but can sometimes take up to 2-3 months.  Eventually you will not be able to fit even a single gauze into the wound.  If the skin is closing prior to the defect filling in, it is urgent to call the surgeon.    Yellow or green drainage is normal on the bottom dressing.  If the wound smells foul, then use more effort with soap and water in the wound in the shower.  You may use peroxide once a day if the wound continues to have increased drainage, foul smell or a persistent green tinge.  Peroxide inhibits wound healing so do not use it more than once a day.     Thank you for entrusting us with your care.

## 2025-05-23 NOTE — ANESTHESIA PROCEDURE NOTES
Airway  Date/Time: 5/23/2025 8:56 AM  Reason: elective    Airway not difficult    General Information and Staff   Patient location during procedure: OR  Anesthesiologist: Ellen Dash MD  Performed: anesthesiologist   Performed by: Ellen Dash MD  Authorized by: Ellen Dash MD        Indications and Patient Condition  Indications for airway management: anesthesia  Sedation level: deep      Preoxygenated: yesPatient position: sniffing    Mask difficulty assessment: 0 - not attempted    Final Airway Details    Final airway type: endotracheal airway    Successful airway: ETT  Cuffed: yes   Successful intubation technique: direct laryngoscopy  Endotracheal tube insertion site: oral  Blade: Kelle  Blade size: #3  ETT size (mm): 7.0    Cormack-Lehane Classification: grade I - full view of glottis  Placement verified by: capnometry   Measured from: lips  ETT to lips (cm): 21  Number of attempts at approach: 1

## 2025-05-23 NOTE — ANESTHESIA PREPROCEDURE EVALUATION
PRE-OP EVALUATION    Patient Name: Mary Wei    Admit Diagnosis: Perianal abscess [K61.0]    Pre-op Diagnosis: Perianal abscess [K61.0]    ANAL EXAMINATION UNDER ANESTHESIA WITH ANOSCOPY, POSSIBLE FISTULOTOMY, POSSIBLE SETON PLACEMENT, POSSIBLE PILONIDAL CYSTECTOMY    Anesthesia Procedure: ANAL EXAMINATION UNDER ANESTHESIA WITH ANOSCOPY, POSSIBLE FISTULOTOMY, POSSIBLE SETON PLACEMENT, POSSIBLE PILONIDAL CYSTECTOMY  .    Surgeons and Role:     * Checo Herrera MD - Primary    Pre-op vitals reviewed.  Temp: 98.5 °F (36.9 °C)  Pulse: 93  Resp: 17  BP: 121/73  SpO2: 100 %  Body mass index is 30.82 kg/m².    Current medications reviewed.  Hospital Medications:  Current Medications[1]    Outpatient Medications:   Prescriptions Prior to Admission[2]    Allergies: Dander      Anesthesia Evaluation    Patient summary reviewed.    Anesthetic Complications  (-) history of anesthetic complications         GI/Hepatic/Renal    Negative GI/hepatic/renal ROS.                             Cardiovascular    Negative cardiovascular ROS.        MET: >4                                           Endo/Other    Negative endo/other ROS.                              Pulmonary    Negative pulmonary ROS.                       Neuro/Psych    Negative neuro/psych ROS.                                  Past Surgical History[3]  Social Hx on file[4]  History   Drug Use Unknown     Available pre-op labs reviewed.               Airway      Mallampati: II  Mouth opening: >3 FB  TM distance: > 6 cm  Neck ROM: full Cardiovascular    Cardiovascular exam normal.  Rhythm: regular  Rate: normal     Dental  Comment: Denies chipped or loose teeth           Pulmonary    Pulmonary exam normal.  Breath sounds clear to auscultation bilaterally.               Other findings              ASA: 1   Plan: general  NPO status verified and patient meets guidelines.    Post-procedure pain management plan discussed with surgeon and patient.    Comment: Risks  include but limited to oral and dental injury, nausea/vomiting, anaphylaxis, prolonged ventilation and myocardial infarction. All questions were answered to the patient's satisfaction. Patient expressed understanding and wishes to proceed.   Plan/risks discussed with: patient                Present on Admission:  **None**             [1]    [Transfer Hold] acetaminophen (Tylenol Extra Strength) tab 1,000 mg  1,000 mg Oral Once    [Transfer Hold] scopolamine (Transderm-Scop) 1 MG/3DAYS patch 1 patch  1 patch Transdermal Once    lactated ringers infusion   Intravenous Continuous    ceFAZolin (Ancef) 2g in 10mL IV syringe premix  2 g Intravenous Once    And    metroNIDAZOLE in sodium chloride 0.79% (Flagyl) 5 mg/mL IVPB premix 500 mg  500 mg Intravenous Once   [2]   Medications Prior to Admission   Medication Sig Dispense Refill Last Dose/Taking    Doxycycline Hyclate 20 MG Oral Tab Take 1 tablet (20 mg total) by mouth daily.   Past Month    NIACINAMIDE OR Take by mouth.   Past Month    Multiple Vitamins-Minerals (MULTI-VITAMIN/MINERALS) Oral Tab Take 1 tablet by mouth in the morning.   Past Month    spironolactone 25 MG Oral Tab Take 1 tablet (25 mg total) by mouth in the morning.   Past Month    Levocetirizine Dihydrochloride 5 MG Oral Tab Take 1 tablet (5 mg total) by mouth every evening. 90 tablet 2 Past Month    Mometasone Furoate 0.1 % External Cream Apply 1 Application topically 2 (two) times daily as needed. (Patient not taking: Reported on 5/14/2025) 45 g 0 Not Taking    Bacitracin-Polymyxin B 500-93183 UNIT/GM External Ointment Apply 1 Application topically 2 (two) times daily. (Patient not taking: Reported on 5/14/2025) 15 g 0 Not Taking    Fish Oil-Cholecalciferol (FISH OIL + D3 OR) Take by mouth.   More than a month   [3]   Past Surgical History:  Procedure Laterality Date    Other surgical history  09/11/2024    I & D OF PILONIDAL SINUS   [4]   Social History  Socioeconomic History    Marital status:  Single   Tobacco Use    Smoking status: Never     Passive exposure: Never    Smokeless tobacco: Never    Tobacco comments:     No  Households Smokers    Vaping Use    Vaping status: Never Used   Substance and Sexual Activity    Alcohol use: Not Currently    Drug use: Never   Other Topics Concern    Caffeine Concern No    Stress Concern No    Weight Concern Yes    Special Diet No    Exercise Yes    Seat Belt No    Pt has a pacemaker No    Pt has a defibrillator No    Reaction to local anesthetic No

## 2025-05-27 ENCOUNTER — TELEPHONE (OUTPATIENT)
Facility: LOCATION | Age: 25
End: 2025-05-27

## 2025-05-28 ENCOUNTER — TELEPHONE (OUTPATIENT)
Facility: LOCATION | Age: 25
End: 2025-05-28

## 2025-06-02 ENCOUNTER — OFFICE VISIT (OUTPATIENT)
Facility: LOCATION | Age: 25
End: 2025-06-02
Payer: COMMERCIAL

## 2025-06-02 VITALS
OXYGEN SATURATION: 99 % | SYSTOLIC BLOOD PRESSURE: 117 MMHG | DIASTOLIC BLOOD PRESSURE: 71 MMHG | HEART RATE: 73 BPM | TEMPERATURE: 99 F

## 2025-06-02 DIAGNOSIS — Z98.890 POST-OPERATIVE STATE: Primary | ICD-10-CM

## 2025-06-02 NOTE — PROGRESS NOTES
Post Operative Visit Note       Active Problems  1. Post-operative state         Chief Complaint   Chief Complaint   Patient presents with    Post-Op     PO - ANAL EXAMINATION UNDER ANESTHESIA WITH ANOSCOPY, PILONIDAL CYSTECTOMY W/ PEPE 5/23. . Patient states no pain, feeling good. Slight packing. Not a long of drainage.           History of Present Illness   The patient presents today for postoperative visit following anal exam under anesthesia with anoscopy and pilonidal cystectomy on 5/23/2025 with Dr. Herrera.    Patient feels well overall postoperatively.  She currently denies increased pain to the wound, stating she only alternated Tylenol and ibuprofen during the immediate postoperative period.  Patient states she noticed a small area of bleeding on the superior aspect of her wound, as well as purulent drainage both on the gauze while doing dressing changes.  She endorses cleansing the area with saline.  Patient completes dressing changes 3 times daily. She denies constipation and diarrhea.  She denies fever and chills.      Allergies  Mary is allergic to dander.    Past Medical / Surgical / Social / Family History    The past medical and past surgical history have been reviewed by me today.     Past Medical History:    Allergic rhinitis    Anxiety    Attention deficit disorder    POSSIBLE, NOT OFFICIALLY DIAGNOSED, NO MEDICATION/TREATMENT    Strep throat    Visual impairment    GLASSES     Past Surgical History:   Procedure Laterality Date    Other surgical history  09/11/2024    I & D OF PILONIDAL SINUS       The family history and social history have been reviewed by me today.    Family History   Problem Relation Age of Onset    Depression Mother     Other (Other) Father         Myasthenia Gravis    Asthma Father     Other (Other) Other         No Sudden Cardiac Death of Close Relative     Social History     Socioeconomic History    Marital status: Single   Tobacco Use    Smoking status: Never     Passive  exposure: Never    Smokeless tobacco: Never    Tobacco comments:     No  Households Smokers    Vaping Use    Vaping status: Never Used   Substance and Sexual Activity    Alcohol use: Not Currently    Drug use: Never   Other Topics Concern    Caffeine Concern No    Stress Concern No    Weight Concern Yes    Special Diet No    Exercise Yes    Seat Belt No    Pt has a pacemaker No    Pt has a defibrillator No    Reaction to local anesthetic No        Current Outpatient Medications:     Mometasone Furoate 0.1 % External Cream, Apply 1 Application topically 2 (two) times daily as needed., Disp: 45 g, Rfl: 0    Bacitracin-Polymyxin B [id number] UNIT/GM External Ointment, Apply 1 Application topically 2 (two) times daily., Disp: 15 g, Rfl: 0    Levocetirizine Dihydrochloride 5 MG Oral Tab, Take 1 tablet (5 mg total) by mouth every evening., Disp: 90 tablet, Rfl: 2    Doxycycline Hyclate 20 MG Oral Tab, Take 1 tablet (20 mg total) by mouth daily. (Patient not taking: Reported on 6/2/2025), Disp: , Rfl:     NIACINAMIDE OR, Take by mouth. (Patient not taking: Reported on 6/2/2025), Disp: , Rfl:     Fish Oil-Cholecalciferol (FISH OIL + D3 OR), Take by mouth. (Patient not taking: Reported on 6/2/2025), Disp: , Rfl:     Multiple Vitamins-Minerals (MULTI-VITAMIN/MINERALS) Oral Tab, Take 1 tablet by mouth in the morning. (Patient not taking: Reported on 6/2/2025), Disp: , Rfl:     spironolactone 25 MG Oral Tab, Take 1 tablet (25 mg total) by mouth in the morning. (Patient not taking: Reported on 6/2/2025), Disp: , Rfl:       Review of Systems  A 10 point Review of Systems has been completed by me today and is negative except as above in the HPI.    Physical Findings   /71 (BP Location: Left arm, Patient Position: Sitting, Cuff Size: adult)   Pulse 73   Temp 98.9 °F (37.2 °C) (Temporal)   LMP 04/28/2025 (Within Days)   SpO2 99%   Gen/psych: alert and oriented, cooperative, no apparent distress  Cardiovascular: regular  rate  Respiratory: respirations unlabored, no wheeze  Perianal: Exam limited to external exam only.  Medical chaperone Marija Vazquez CMA was present for the entirety of the exam.  Patient in jackknife position.  Open wound at superior gluteal cleft with healthy pink granulation tissue. Superior aspect of the wound with small amount of bleeding noted.  There was also an area of tunneling just below the superior aspect with bleeding noted.  No active purulent drainage, though thicker yellow-tinged drainage on dressing. Fibrinous exudates in the inferior aspect of the wound. Silver nitrate applied to areas of bleeding.  Wet-to-dry dressing change applied.         Assessment/Plan  1. Post-operative state        The patient is doing well postoperatively.  Augmentin x 7 days written for purulence on dressing.  Continue diet as tolerated.  Tylenol and Ibuprofen as needed for pain control. Can do Sitz baths for comfort. Patient may also apply cold compress for comfort as needed.  Encouraged the patient to cleanse the area with gentle soap and water. Continue wet to dry dressing changes and change twice daily or as needed for saturation.  Pathology discussed with the patient.  Pilonidal cyst, excision:  - Ruptured, inflamed, pilonidal cyst.  All the patient's questions and concerns were addressed. They voiced understanding and are in agreement with the plan.  Follow up in 2 weeks for wound check, sooner if needed.         No orders of the defined types were placed in this encounter.       Imaging & Referrals   None    Follow Up  2 weeks for wound check with PA, sooner if needed.    RAND George  [facility] General Surgery  6/2/2025  2:09 PM

## 2025-06-19 ENCOUNTER — OFFICE VISIT (OUTPATIENT)
Facility: LOCATION | Age: 25
End: 2025-06-19
Payer: COMMERCIAL

## 2025-06-19 VITALS
HEART RATE: 72 BPM | SYSTOLIC BLOOD PRESSURE: 114 MMHG | TEMPERATURE: 98 F | DIASTOLIC BLOOD PRESSURE: 62 MMHG | OXYGEN SATURATION: 98 %

## 2025-06-19 DIAGNOSIS — Z09 POSTOP CHECK: Primary | ICD-10-CM

## 2025-06-19 PROCEDURE — 99024 POSTOP FOLLOW-UP VISIT: CPT | Performed by: PHYSICIAN ASSISTANT

## 2025-06-19 NOTE — PROGRESS NOTES
Post Operative Visit Note       Active Problems  1. Postop check         Chief Complaint   Chief Complaint   Patient presents with    Post-Op     PO - Wound Check. ANAL EXAMINATION UNDER ANESTHESIA WITH ANOSCOPY, PILONIDAL CYSTECTOMY W/ JAMRIT 5/23 - yellowish drainage.   No packing           History of Present Illness   The patient presents today for postoperative visit following anal exam under anesthesia, pilonidal cystectomy on 5/23/2025 with Dr. Herrera.  Patient was last seen in clinic on 6/2 in which she was noted to have some purulent drainage.  She was prescribed a course of Augmentin.  Patient returns today stating that she is feeling well and without acute complaints.  She has finished her antibiotic.  She is applying dry gauze over wound.  Reports scant amount of clear/yellow drainage on dressing, denies any purulence.  She states she otherwise is feeling well.  She denies any fevers or chills.  Tolerating a diet without nausea or vomiting.  Having normal bowel movements.    Allergies  Mary is allergic to dander.    Past Medical / Surgical / Social / Family History    The past medical and past surgical history have been reviewed by me today.     Past Medical History:    Allergic rhinitis    Anxiety    Attention deficit disorder    POSSIBLE, NOT OFFICIALLY DIAGNOSED, NO MEDICATION/TREATMENT    Strep throat    Visual impairment    GLASSES     Past Surgical History:   Procedure Laterality Date    Other surgical history  09/11/2024    I & D OF PILONIDAL SINUS       The family history and social history have been reviewed by me today.    Family History   Problem Relation Age of Onset    Depression Mother     Other (Other) Father         Myasthenia Gravis    Asthma Father     Other (Other) Other         No Sudden Cardiac Death of Close Relative     Social History     Socioeconomic History    Marital status: Single   Tobacco Use    Smoking status: Never     Passive exposure: Never    Smokeless tobacco: Never     Tobacco comments:     No  Households Smokers    Vaping Use    Vaping status: Never Used   Substance and Sexual Activity    Alcohol use: Not Currently    Drug use: Never   Other Topics Concern    Caffeine Concern No    Stress Concern No    Weight Concern Yes    Special Diet No    Exercise Yes    Seat Belt No    Pt has a pacemaker No    Pt has a defibrillator No    Reaction to local anesthetic No        Current Outpatient Medications:     amoxicillin clavulanate 875-125 MG Oral Tab, Take 1 tablet by mouth 2 (two) times daily., Disp: 14 tablet, Rfl: 0    Mometasone Furoate 0.1 % External Cream, Apply 1 Application topically 2 (two) times daily as needed., Disp: 45 g, Rfl: 0    Bacitracin-Polymyxin B 500-46067 UNIT/GM External Ointment, Apply 1 Application topically 2 (two) times daily., Disp: 15 g, Rfl: 0    Levocetirizine Dihydrochloride 5 MG Oral Tab, Take 1 tablet (5 mg total) by mouth every evening., Disp: 90 tablet, Rfl: 2    Doxycycline Hyclate 20 MG Oral Tab, Take 1 tablet (20 mg total) by mouth daily. (Patient not taking: Reported on 6/19/2025), Disp: , Rfl:     NIACINAMIDE OR, Take by mouth. (Patient not taking: Reported on 6/19/2025), Disp: , Rfl:     Fish Oil-Cholecalciferol (FISH OIL + D3 OR), Take by mouth. (Patient not taking: Reported on 6/19/2025), Disp: , Rfl:     Multiple Vitamins-Minerals (MULTI-VITAMIN/MINERALS) Oral Tab, Take 1 tablet by mouth in the morning. (Patient not taking: Reported on 6/19/2025), Disp: , Rfl:     spironolactone 25 MG Oral Tab, Take 1 tablet (25 mg total) by mouth in the morning. (Patient not taking: Reported on 6/19/2025), Disp: , Rfl:       Review of Systems  A 10 point Review of Systems has been completed by me today and is negative except as above in the HPI.    Physical Findings   /62 (BP Location: Left arm, Patient Position: Sitting, Cuff Size: adult)   Pulse 72   Temp 97.5 °F (36.4 °C) (Temporal)   LMP 04/28/2025 (Within Days)   SpO2 98%   Gen/psych: alert  and oriented, cooperative, no apparent distress  Cardiovascular: regular rate  Respiratory: respirations unlabored, no wheeze  Abdominal: soft, non-tender, non-distended, no guarding/rebound  Incision: Patient was examined in the prone jackknife position with medical chaperone present.  Gluteal cleft wound measuring 4 cm long x 2 cm wide x 1 cm deep, with healthy red granulation tissue present.  Surrounding skin clean and without erythema.  No purulence.  Small amount of bleeding with dressing change.  No overlying fibrinous exudates.  Wet to dry dressing applied.         Assessment/Plan  1. Postop check      The patient presents today for postoperative visit following anal exam under anesthesia, pilonidal cystectomy on 5/23/2025 with Dr. Herrera.      The patient is doing well postoperatively   Exam as above. Gluteal cleft wound measuring 4 cm long x 2 cm wide x 1 cm deep, with healthy red granulation tissue present.  Surrounding skin clean and without erythema.  No purulence.  Small amount of bleeding with dressing change.  Wet to dry dressing applied.  Continue with wound care. Recommend twice daily wet-to-dry dressing changes.  Gentle soap and water over incision.   Tylenol and ibuprofen as directed for pain  All questions and concerns were answered.  The patient is scheduled to follow-up with Dr. Herrera on June 30, sooner as needed          No orders of the defined types were placed in this encounter.       Imaging & Referrals   None    Follow Up  Return in 1 week (on 6/26/2025), or if symptoms worsen or fail to improve.    Carmenza Bautista PA-C  [facility] General Surgery  6/19/2025  1:04 PM

## 2025-07-07 ENCOUNTER — TELEPHONE (OUTPATIENT)
Facility: LOCATION | Age: 25
End: 2025-07-07

## 2025-07-07 ENCOUNTER — OFFICE VISIT (OUTPATIENT)
Facility: LOCATION | Age: 25
End: 2025-07-07
Payer: COMMERCIAL

## 2025-07-07 VITALS
SYSTOLIC BLOOD PRESSURE: 125 MMHG | DIASTOLIC BLOOD PRESSURE: 75 MMHG | OXYGEN SATURATION: 100 % | TEMPERATURE: 98 F | HEART RATE: 81 BPM

## 2025-07-07 DIAGNOSIS — Z09 POSTOP CHECK: ICD-10-CM

## 2025-07-07 DIAGNOSIS — L05.91 PILONIDAL CYST: Primary | ICD-10-CM

## 2025-07-07 PROCEDURE — 99024 POSTOP FOLLOW-UP VISIT: CPT | Performed by: STUDENT IN AN ORGANIZED HEALTH CARE EDUCATION/TRAINING PROGRAM

## 2025-07-08 NOTE — PROGRESS NOTES
Follow Up Visit Note    The following individual(s) verbally consented to be recorded using ambient AI listening technology and understand that they can each withdraw their consent to this listening technology at any point by asking the clinician to turn off or pause the recording:    Patient name: Mary Wei          Active Problems      1. Pilonidal cyst    2. Postop check          Chief Complaint   Chief Complaint   Patient presents with    Post-Op     PO - ANAL EXAMINATION UNDER ANESTHESIA WITH ANOSCOPY, PILONIDAL CYSTECTOMY W/ JJS 5/23. Pt doing well. Pt denies any pain or bleeding. Pt reports some light yellow drainage without any foul odor. Pt does note the drainage has recently become slightly greener.          History of Present Illness  History of Present Illness  Mary Wei is a 25 year old female with a history of asymptomatic pilonidal cyst status post pilonidal cystectomy on 5/23/2025.  She returns for ongoing postoperative follow-up today.    Patient is doing well overall.  She experiences ongoing drainage from the surgical site. The drainage is described as having a very light greenish hue. She changes the dressing twice daily, once in the morning and once at night.  Patient's mother and sister are helping with wound care    She reports no significant pain and only minimal bleeding, which occurs primarily when sitting for extended periods. No other symptoms are noted.         Allergies  Mary is allergic to dander.    Past Medical / Surgical / Social / Family History    The past medical and past surgical history have been reviewed by me today.    Past Medical History[1]  Past Surgical History[2]    The family history and social history have been reviewed by me today.    Family History[3]  Social Hx on file[4]   Medications - Current[5]     Review of Systems  A 10 point review of systems was performed and negative unless otherwise documented per HPI.     Physical Findings    /75 (BP Location: Right arm, Patient Position: Sitting, Cuff Size: adult)   Pulse 81   Temp 98.1 °F (36.7 °C) (Temporal)   LMP 2025 (Within Days)   SpO2 100%   Physical Exam  Vitals and nursing note reviewed. Exam conducted with a chaperone present.   Constitutional:       General: She is not in acute distress.  HENT:      Head: Normocephalic and atraumatic.      Mouth/Throat:      Mouth: Mucous membranes are moist.   Cardiovascular:      Rate and Rhythm: Normal rate and regular rhythm.   Pulmonary:      Effort: Pulmonary effort is normal.   Abdominal:      General: There is no distension.      Palpations: Abdomen is soft.      Tenderness: There is no abdominal tenderness.   Genitourinary:     Comments: Patient examined in the prone jackknife position with female medical assistant chaperone present.  External exam of the anus reveals a persistent open wound in the midline  cleft overlying the coccyx measuring 3 x 1 x 0.5 cm.  The wound is filled with beefy red granulation tissue.  No active bleeding or drainage.  There is a well-healed scar about 1-2 cm cephalad to the open wound just to the right of midline.  Musculoskeletal:         General: No deformity.   Skin:     General: Skin is warm and dry.   Neurological:      General: No focal deficit present.      Mental Status: She is alert.   Psychiatric:         Mood and Affect: Mood normal.           Assessment & Plan  Pilonidal cyst status post pilonidal cystectomy  Patient is doing well overall with minimal drainage and no other symptoms.  Wound appears to be healing appropriately on exam today  - Continue wound care with wet-to-dry dressing changes twice daily.  - I recommend ongoing follow-up for wound checks.  Patient should follow-up with a PA in about 2 weeks and back with me in about 1 month.  - No bathing, dietary activity restrictions  - Patient is welcome to contact me in the meantime with any new or worsening symptoms          No  orders of the defined types were placed in this encounter.      Imaging & Referrals   None    Follow Up  No follow-ups on file.    Checo Herrera MD           [1]   Past Medical History:   Allergic rhinitis    Anxiety    Attention deficit disorder    POSSIBLE, NOT OFFICIALLY DIAGNOSED, NO MEDICATION/TREATMENT    Strep throat    Visual impairment    GLASSES   [2]   Past Surgical History:  Procedure Laterality Date    Other surgical history  09/11/2024    I & D OF PILONIDAL SINUS   [3]   Family History  Problem Relation Age of Onset    Depression Mother     Other (Other) Father         Myasthenia Gravis    Asthma Father     Other (Other) Other         No Sudden Cardiac Death of Close Relative   [4]   Social History  Socioeconomic History    Marital status: Single   Tobacco Use    Smoking status: Never     Passive exposure: Never    Smokeless tobacco: Never    Tobacco comments:     No  Households Smokers    Vaping Use    Vaping status: Never Used   Substance and Sexual Activity    Alcohol use: Not Currently    Drug use: Never   Other Topics Concern    Caffeine Concern No    Stress Concern No    Weight Concern Yes    Special Diet No    Exercise No    Seat Belt No    Pt has a pacemaker No    Pt has a defibrillator No    Reaction to local anesthetic No   [5]   Current Outpatient Medications:     Mometasone Furoate 0.1 % External Cream, Apply 1 Application topically 2 (two) times daily as needed., Disp: 45 g, Rfl: 0    Levocetirizine Dihydrochloride 5 MG Oral Tab, Take 1 tablet (5 mg total) by mouth every evening., Disp: 90 tablet, Rfl: 2    amoxicillin clavulanate 875-125 MG Oral Tab, Take 1 tablet by mouth 2 (two) times daily., Disp: 14 tablet, Rfl: 0    Doxycycline Hyclate 20 MG Oral Tab, Take 1 tablet (20 mg total) by mouth daily. (Patient not taking: Reported on 6/2/2025), Disp: , Rfl:     NIACINAMIDE OR, Take by mouth. (Patient not taking: Reported on 6/2/2025), Disp: , Rfl:     Bacitracin-Polymyxin B 500-02244  UNIT/GM External Ointment, Apply 1 Application topically 2 (two) times daily., Disp: 15 g, Rfl: 0    Fish Oil-Cholecalciferol (FISH OIL + D3 OR), Take by mouth. (Patient not taking: Reported on 6/2/2025), Disp: , Rfl:     Multiple Vitamins-Minerals (MULTI-VITAMIN/MINERALS) Oral Tab, Take 1 tablet by mouth in the morning. (Patient not taking: Reported on 6/2/2025), Disp: , Rfl:     spironolactone 25 MG Oral Tab, Take 1 tablet (25 mg total) by mouth in the morning. (Patient not taking: Reported on 6/2/2025), Disp: , Rfl:

## 2025-07-11 ENCOUNTER — TELEPHONE (OUTPATIENT)
Facility: LOCATION | Age: 25
End: 2025-07-11

## 2025-07-22 ENCOUNTER — OFFICE VISIT (OUTPATIENT)
Facility: LOCATION | Age: 25
End: 2025-07-22
Payer: COMMERCIAL

## 2025-07-22 VITALS
HEART RATE: 75 BPM | DIASTOLIC BLOOD PRESSURE: 73 MMHG | SYSTOLIC BLOOD PRESSURE: 120 MMHG | OXYGEN SATURATION: 99 % | TEMPERATURE: 98 F

## 2025-07-22 DIAGNOSIS — Z09 POSTOP CHECK: Primary | ICD-10-CM

## 2025-07-22 PROCEDURE — 99024 POSTOP FOLLOW-UP VISIT: CPT | Performed by: PHYSICIAN ASSISTANT

## 2025-07-22 NOTE — PROGRESS NOTES
Post Operative Visit Note       Active Problems  1. Postop check         Chief Complaint   Chief Complaint   Patient presents with    Post-Op     PO - ANAL EXAMINATION UNDER ANESTHESIA WITH ANOSCOPY, PILONIDAL CYSTECTOMY W/ JAMRIT 5/23 - wound check. Not packing.               History of Present Illness   The patient presents today for postoperative visit following pilonidal cystectomy on 5/23/2025 with Dr. Herrera.  Patient was last seen in clinic on 7/7 by Dr. Herrera and instructed to return today for wound check.  Patient states she is feeling well today and without acute complaints.  She believes the wound continues to heal well.  She is experiencing scant amount of drainage from surgical site, which she describes as bloody and occasionally having a very light greenish hue.  She is changing the dressing twice daily.  She denies any significant pain.  No redness or purulence.  No fevers or chills.        Allergies  Mary is allergic to dander.    Past Medical / Surgical / Social / Family History    The past medical and past surgical history have been reviewed by me today.     Past Medical History:    Allergic rhinitis    Anxiety    Attention deficit disorder    POSSIBLE, NOT OFFICIALLY DIAGNOSED, NO MEDICATION/TREATMENT    Strep throat    Visual impairment    GLASSES     Past Surgical History:   Procedure Laterality Date    Other surgical history  09/11/2024    I & D OF PILONIDAL SINUS       The family history and social history have been reviewed by me today.    Family History   Problem Relation Age of Onset    Depression Mother     Other (Other) Father         Myasthenia Gravis    Asthma Father     Other (Other) Other         No Sudden Cardiac Death of Close Relative     Social History     Socioeconomic History    Marital status: Single   Tobacco Use    Smoking status: Never     Passive exposure: Never    Smokeless tobacco: Never    Tobacco comments:     No  Households Smokers    Vaping Use    Vaping status: Never  Used   Substance and Sexual Activity    Alcohol use: Not Currently    Drug use: Never   Other Topics Concern    Caffeine Concern No    Stress Concern No    Weight Concern Yes    Special Diet No    Exercise No    Seat Belt No    Pt has a pacemaker No    Pt has a defibrillator No    Reaction to local anesthetic No        Current Outpatient Medications:     Mometasone Furoate 0.1 % External Cream, Apply 1 Application topically 2 (two) times daily as needed., Disp: 45 g, Rfl: 0    Fish Oil-Cholecalciferol (FISH OIL + D3 OR), Take by mouth. (Patient not taking: Reported on 2025), Disp: , Rfl:     Multiple Vitamins-Minerals (MULTI-VITAMIN/MINERALS) Oral Tab, Take 1 tablet by mouth in the morning. (Patient not taking: Reported on 2025), Disp: , Rfl:       Review of Systems  A 10 point Review of Systems has been completed by me today and is negative except as above in the HPI.    Physical Findings   /73 (BP Location: Left arm, Patient Position: Sitting, Cuff Size: adult)   Pulse 75   Temp 97.5 °F (36.4 °C) (Temporal)   LMP 2025 (Within Days)   SpO2 99%   Gen/psych: alert and oriented, cooperative, no apparent distress  Cardiovascular: regular rate  Respiratory: respirations unlabored, no wheeze  Abdominal: soft, non-tender, non-distended, no guarding/rebound  Rectal: Patient was examined in the prone jackknife position with chaperone present, Tomas MENDIOLA, midline alisa cleft overlying the coccyx with 3 cm long superficial wound with healthy granulation tissue present.  The inferior aspect of the wound does track a bit deeper.  No drainage or purulence.  No surrounding erythema.         Assessment/Plan  1. Postop check      The patient presents today for postoperative visit following pilonidal cystectomy on 2025 with Dr. Herrera.      The patient continues to do well postoperatively   Wound is healing well without any signs of infection  Exam as above.  Silver nitrate was applied to wound at  today's visit, which patient tolerated well  Continue with local wound care including soap and water over wound.  Wound is now superficial and does not require any further packing. She should apply dry gauze over top and change daily, or as needed for saturation.  Keep area clean of hair  We discussed activity restrictions  All questions and concerns were answered.  The patient is scheduled to follow-up with Dr. Herrera in 2 weeks, sooner as needed           No orders of the defined types were placed in this encounter.       Imaging & Referrals   None    Follow Up  Return if symptoms worsen or fail to improve.    Carmenza Bautista PA-C  [facility] General Surgery  7/22/2025  2:29 PM

## 2025-08-05 ENCOUNTER — OFFICE VISIT (OUTPATIENT)
Facility: LOCATION | Age: 25
End: 2025-08-05

## 2025-08-05 DIAGNOSIS — L05.91 PILONIDAL CYST: ICD-10-CM

## 2025-08-05 DIAGNOSIS — Z09 POSTOP CHECK: Primary | ICD-10-CM

## 2025-08-05 PROCEDURE — 99024 POSTOP FOLLOW-UP VISIT: CPT | Performed by: STUDENT IN AN ORGANIZED HEALTH CARE EDUCATION/TRAINING PROGRAM

## (undated) DEVICE — GAUZE,SPONGE,4"X4",12PLY,STERILE,LF,2'S: Brand: MEDLINE

## (undated) DEVICE — SLEEVE COMPR MD KNEE LEN SGL USE KENDALL SCD

## (undated) DEVICE — STANDARD HYPODERMIC NEEDLE,POLYPROPYLENE HUB: Brand: MONOJECT

## (undated) DEVICE — PAD,ABDOMINAL,8"X7.5",ST,LF,20/BX: Brand: MEDLINE INDUSTRIES, INC.

## (undated) DEVICE — SOLUTION IRRIG 1000ML 0.9% NACL USP BTL

## (undated) DEVICE — SYRINGE MED 10ML LL TIP W/O SFTY DISP

## (undated) DEVICE — SOLUTION PREP 4OZ 10% POVIDONE IOD SCR TOP

## (undated) DEVICE — JELLY,LUBE,STERILE,FLIP TOP,TUBE,2-OZ: Brand: MEDLINE

## (undated) DEVICE — PACK CDS RECTAL

## (undated) DEVICE — SKN PREP SPNG STKS PVP PNT STR: Brand: MEDLINE INDUSTRIES, INC.

## (undated) DEVICE — GLOVE SUR 7 SENSICARE PI PIP CRM PWD F

## (undated) DEVICE — GLOVE SUR 7.5 SENSICARE PI PIP GRN PWD F

## (undated) DEVICE — UNDERPANTS MAT 2XL FOR 24-64IN WAIST PUR

## (undated) NOTE — LETTER
[facility] [address], [address]       12/11/18        Patient: Tyler Marion   YOB: 2000   Date of Visit: 12/11/2018       Dear  Dr. Kristan Ledbetter,      Thank you for referring Tyler Marion to my p

## (undated) NOTE — Clinical Note
VACCINE ADMINISTRATION RECORD  PARENT / GUARDIAN APPROVAL  Date: 3/21/2017  Vaccine administered to: Chucky Quarles     : 3/1/2000    MRN: [id number]    A copy of the appropriate Centers for Disease Control and Prevention Vaccine Information statement

## (undated) NOTE — MR AVS SNAPSHOT
[facility]  [address] [place] [address], [address] [place] [address]               Thank you for choosing us for your health care visit with Mike Bingham. DO Lesly.   We are glad to serve you and happy to provide you with this summary ergocalciferol 41276 units Caps   Take  by mouth. Commonly known as:  ERGOCALCIFEROL           Minocycline HCl 100 MG Tabs   Take once daily with large glass of water and snack. Avoid taking prior to bed.    Commonly known as:  Breana Zurita

## (undated) NOTE — LETTER
Name:  Marco Antonio Del Toro Year:  11th Grade Class: Student ID No.:   Address[address]   Phone:  [phone] (home) [phone] (work) : 2000 13year old   Name Relationship [address] [place] [address],[address] Phone Mobile Ph 8.  Has a doctor ever told you that you have any heart problems? If so, check all that apply: N/A No   9. Has a doctor ever ordered a test for your heart? For example, ECG/EKG. Echocardiogram) No   10.  Do you get lightheaded or feel more short of breath t 25. Do you have any history of juvenile arthritis or connective tissue disease? No    MEDICAL QUESTIONS    26. Do you cough, wheeze, or have difficulty breathing during or after exercise? No   27. Have you ever used an inhaler or taken asthma medication?  Jaky Oscar 48. Have you ever had a menstrual period? Yes   54. How old were you when you had your first period? 56. How many periods have you had in the last 12 months?     Explain \"yes\" answers here:   ____________________________________            I hereby sta history of significant concussion. On the basis of the examination on this day, I approve this child's participation in interscholastic sports for 395 days from this date.    Limited:No                                                                    Exa A complete list of the current [employer] Banned Substance Classes can be accessed at [email or web address].org/initiatives/[email or web address]/[email or web address]_classes. pdf             Signature of student-athlete Date Signature of parent-guardian Date        ©2

## (undated) NOTE — LETTER
04/05/21        [facility] [address]      Dear Clemetine Hamman records indicate that you have outstanding lab work and or testing that was ordered for you and has not yet been completed:  Orders Placed This Encounter

## (undated) NOTE — LETTER
Date & Time: 9/12/2021, 2:59 PM  Patient: Andi Phillips  Encounter Provider(s):    ALEJANDRO Roca       To Whom It May Concern:    Mattie Garrison was seen and treated in our department on 9/12/2021. She should not return to school until 9/14/21.

## (undated) NOTE — LETTER
Date & Time: 2/25/2023, 8:42 AM  Patient: Rabia Eaton  Encounter Provider(s):    ALEJANDRO Barrientos       To Whom It May Concern:    Janes Tomas was seen and treated in our department on 2/25/2023. She should not return to work until she remains fever free for 24 hours w/o use of fever reducing meds. Fever this moring. Off today and maybe tomorrow.     ALEJANDRO Ervin, 02/25/23, 8:43 AM

## (undated) NOTE — LETTER
Date & Time: 8/30/2021, 3:17 PM  Patient: Cm Figueroa  Encounter Provider(s):    ALEJANDRO Gutierrez       To Whom It May Concern:    Janice Viramontes was seen and treated in our department on 8/30/2021.  She should not return to work until september 2,

## (undated) NOTE — LETTER
01/04/21        [facility] [address]      Dear Bucky Luis records indicate that you have outstanding lab work and or testing that was ordered for you and has not yet been completed:  Orders Placed This Encounter

## (undated) NOTE — LETTER
2025    Return to WORK    Name: Mary Wei        : 3/1/2000    To Whom It May Concern,    Mary Wei had surgery on 25 and is:    Able to return to school / work without restrictions on 25.      If there are any further questions, regarding this patient's care, please contact the patient directly.    Sincerely,    DR MAJANO

## (undated) NOTE — MR AVS SNAPSHOT
[facility]  [address] [place] [address]  [phone]               Thank you for choosing us for your health care visit with Pilar Avila.  Mikal Holguin MD.  We are glad to serve you and happy to provide you with this summary of your visit Norethin Ace-Eth Estrad-FE 1-20 MG-MCG Tabs   Take 1 tablet by mouth once daily. What changed:  See the new instructions.    Commonly known as:  MICROGESTIN FE 1/20           Sulfacetamide Sodium (Acne) 10 % Lotn   Use bid prn   Commonly known as:  Joy

## (undated) NOTE — MR AVS SNAPSHOT
[facility] [place] [address] [place]  [id number] [phone]               Thank you for choosing us for your health care visit with Ciro Davila MD.  We are glad to serve you and happy to provide you with this summary of These medications were sent to [facility], [address], [phone], [phone] [address] [place] [address], [address] [place] [address]     Phone:  [phone]    - Minocycline HCl 100 MG Tab o Make it fun – find ways to engage your children such as:  o playing a game of tag  o cooking healthy meals together  o creating a rainbow shopping list to find colorful fruits and vegetables  o go on a walking scavenger hunt through the neighborhood   o

## (undated) NOTE — LETTER
Date & Time: 9/12/2021, 2:57 PM  Patient: Tyler Marion  Encounter Provider(s):    ALEJANDRO Gorman       To Whom It May Concern:    Erin Taylor was seen and treated in our department on 9/12/2021. She should not return to work until 9/15/21.     I

## (undated) NOTE — LETTER
01/03/22        [facility] [place] [address]      Dear Jaclyn Alexis records indicate that you have outstanding lab work and or testing that was ordered for you and has not yet been completed:  Orders Placed This Encounter